# Patient Record
Sex: MALE | Race: ASIAN
[De-identification: names, ages, dates, MRNs, and addresses within clinical notes are randomized per-mention and may not be internally consistent; named-entity substitution may affect disease eponyms.]

---

## 2017-03-07 ENCOUNTER — HOSPITAL (OUTPATIENT)
Dept: OTHER | Age: 82
End: 2017-03-07
Attending: FAMILY MEDICINE

## 2017-12-15 ENCOUNTER — LAB ENCOUNTER (OUTPATIENT)
Dept: LAB | Age: 82
End: 2017-12-15
Attending: PHYSICIAN ASSISTANT
Payer: MEDICARE

## 2017-12-15 DIAGNOSIS — R30.0 DYSURIA: Primary | ICD-10-CM

## 2017-12-15 PROCEDURE — 87086 URINE CULTURE/COLONY COUNT: CPT

## 2021-06-15 ENCOUNTER — HOSPITAL ENCOUNTER (OUTPATIENT)
Dept: GENERAL RADIOLOGY | Age: 86
Discharge: HOME | End: 2021-06-15
Attending: ALLERGY & IMMUNOLOGY
Payer: MEDICAID

## 2021-06-15 DIAGNOSIS — M47.26 OSTEOARTHRITIS OF SPINE WITH RADICULOPATHY, LUMBAR REGION: ICD-10-CM

## 2021-06-15 DIAGNOSIS — J44.9 CHRONIC OBSTRUCTIVE PULMONARY DISEASE, UNSPECIFIED COPD TYPE (HCC): ICD-10-CM

## 2021-06-15 PROCEDURE — 71046 X-RAY EXAM CHEST 2 VIEWS: CPT | Performed by: ALLERGY & IMMUNOLOGY

## 2021-06-15 PROCEDURE — 73560 X-RAY EXAM OF KNEE 1 OR 2: CPT | Performed by: ALLERGY & IMMUNOLOGY

## 2023-08-22 ENCOUNTER — HOSPITAL ENCOUNTER (OUTPATIENT)
Age: 88
Discharge: HOME | End: 2023-08-22
Attending: EMERGENCY MEDICINE
Payer: MEDICARE

## 2023-08-22 ENCOUNTER — APPOINTMENT (OUTPATIENT)
Dept: GENERAL RADIOLOGY | Age: 88
End: 2023-08-22
Attending: EMERGENCY MEDICINE
Payer: MEDICARE

## 2023-08-22 VITALS
OXYGEN SATURATION: 97 % | DIASTOLIC BLOOD PRESSURE: 83 MMHG | HEART RATE: 92 BPM | TEMPERATURE: 98 F | SYSTOLIC BLOOD PRESSURE: 161 MMHG | RESPIRATION RATE: 18 BRPM

## 2023-08-22 DIAGNOSIS — S42.035A CLOSED NONDISPLACED FRACTURE OF ACROMIAL END OF LEFT CLAVICLE, INITIAL ENCOUNTER: Primary | ICD-10-CM

## 2023-08-22 DIAGNOSIS — S22.42XA CLOSED FRACTURE OF MULTIPLE RIBS OF LEFT SIDE, INITIAL ENCOUNTER: ICD-10-CM

## 2023-08-22 PROCEDURE — 73060 X-RAY EXAM OF HUMERUS: CPT | Performed by: EMERGENCY MEDICINE

## 2023-08-22 PROCEDURE — 99204 OFFICE O/P NEW MOD 45 MIN: CPT

## 2023-08-22 PROCEDURE — 73000 X-RAY EXAM OF COLLAR BONE: CPT | Performed by: EMERGENCY MEDICINE

## 2023-08-22 PROCEDURE — 99214 OFFICE O/P EST MOD 30 MIN: CPT

## 2023-08-22 PROCEDURE — 71101 X-RAY EXAM UNILAT RIBS/CHEST: CPT | Performed by: EMERGENCY MEDICINE

## 2023-08-22 PROCEDURE — 23500 CLTX CLAVICULAR FX W/O MNPJ: CPT

## 2023-11-16 ENCOUNTER — HOSPITAL ENCOUNTER (INPATIENT)
Facility: HOSPITAL | Age: 88
LOS: 4 days | Discharge: HOME HEALTH | End: 2023-11-20
Attending: EMERGENCY MEDICINE | Admitting: HOSPITALIST
Payer: MEDICARE

## 2023-11-16 ENCOUNTER — APPOINTMENT (OUTPATIENT)
Dept: GENERAL RADIOLOGY | Facility: HOSPITAL | Age: 88
End: 2023-11-16
Attending: EMERGENCY MEDICINE
Payer: MEDICARE

## 2023-11-16 DIAGNOSIS — J18.9 COMMUNITY ACQUIRED PNEUMONIA OF RIGHT LOWER LOBE OF LUNG: Primary | ICD-10-CM

## 2023-11-16 PROBLEM — J69.0 ASPIRATION PNEUMONITIS (HCC): Status: ACTIVE | Noted: 2023-11-16

## 2023-11-16 LAB
ADENOVIRUS PCR:: NOT DETECTED
ALBUMIN SERPL-MCNC: 4.2 G/DL (ref 3.2–4.8)
ALBUMIN/GLOB SERPL: 1.2 {RATIO} (ref 1–2)
ALP LIVER SERPL-CCNC: 96 U/L
ALT SERPL-CCNC: 13 U/L
ANION GAP SERPL CALC-SCNC: 5 MMOL/L (ref 0–18)
AST SERPL-CCNC: 20 U/L (ref ?–34)
ATRIAL RATE: 104 BPM
B PARAPERT DNA SPEC QL NAA+PROBE: NOT DETECTED
B PERT DNA SPEC QL NAA+PROBE: NOT DETECTED
BASOPHILS # BLD AUTO: 0.02 X10(3) UL (ref 0–0.2)
BASOPHILS NFR BLD AUTO: 0.1 %
BILIRUB SERPL-MCNC: 0.4 MG/DL (ref 0.2–0.9)
BILIRUB UR QL: NEGATIVE
BNP SERPL-MCNC: 210 PG/ML
BUN BLD-MCNC: 25 MG/DL (ref 9–23)
BUN/CREAT SERPL: 24 (ref 10–20)
C PNEUM DNA SPEC QL NAA+PROBE: NOT DETECTED
CALCIUM BLD-MCNC: 9.2 MG/DL (ref 8.7–10.4)
CHLORIDE SERPL-SCNC: 95 MMOL/L (ref 98–112)
CLARITY UR: CLEAR
CO2 SERPL-SCNC: 30 MMOL/L (ref 21–32)
COLOR UR: YELLOW
CORONAVIRUS 229E PCR:: NOT DETECTED
CORONAVIRUS HKU1 PCR:: NOT DETECTED
CORONAVIRUS NL63 PCR:: NOT DETECTED
CORONAVIRUS OC43 PCR:: NOT DETECTED
CREAT BLD-MCNC: 1.04 MG/DL
DEPRECATED RDW RBC AUTO: 42.5 FL (ref 35.1–46.3)
EGFRCR SERPLBLD CKD-EPI 2021: 64 ML/MIN/1.73M2 (ref 60–?)
EOSINOPHIL # BLD AUTO: 0.03 X10(3) UL (ref 0–0.7)
EOSINOPHIL NFR BLD AUTO: 0.2 %
ERYTHROCYTE [DISTWIDTH] IN BLOOD BY AUTOMATED COUNT: 13 % (ref 11–15)
FLUAV RNA SPEC QL NAA+PROBE: NOT DETECTED
FLUBV RNA SPEC QL NAA+PROBE: NOT DETECTED
GLOBULIN PLAS-MCNC: 3.5 G/DL (ref 2.8–4.4)
GLUCOSE BLD-MCNC: 140 MG/DL (ref 70–99)
GLUCOSE UR-MCNC: NORMAL MG/DL
HCT VFR BLD AUTO: 37.4 %
HGB BLD-MCNC: 11.9 G/DL
HGB UR QL STRIP.AUTO: NEGATIVE
IMM GRANULOCYTES # BLD AUTO: 0.05 X10(3) UL (ref 0–1)
IMM GRANULOCYTES NFR BLD: 0.4 %
KETONES UR-MCNC: NEGATIVE MG/DL
L PNEUMO AG UR QL: NEGATIVE
LEUKOCYTE ESTERASE UR QL STRIP.AUTO: NEGATIVE
LYMPHOCYTES # BLD AUTO: 1.53 X10(3) UL (ref 1–4)
LYMPHOCYTES NFR BLD AUTO: 11 %
MCH RBC QN AUTO: 28.5 PG (ref 26–34)
MCHC RBC AUTO-ENTMCNC: 31.8 G/DL (ref 31–37)
MCV RBC AUTO: 89.5 FL
METAPNEUMOVIRUS PCR:: NOT DETECTED
MONOCYTES # BLD AUTO: 1.1 X10(3) UL (ref 0.1–1)
MONOCYTES NFR BLD AUTO: 7.9 %
MYCOPLASMA PNEUMONIA PCR:: NOT DETECTED
NEUTROPHILS # BLD AUTO: 11.18 X10 (3) UL (ref 1.5–7.7)
NEUTROPHILS # BLD AUTO: 11.18 X10(3) UL (ref 1.5–7.7)
NEUTROPHILS NFR BLD AUTO: 80.4 %
NITRITE UR QL STRIP.AUTO: NEGATIVE
OSMOLALITY SERPL CALC.SUM OF ELEC: 277 MOSM/KG (ref 275–295)
P AXIS: 72 DEGREES
P-R INTERVAL: 226 MS
PARAINFLUENZA 1 PCR:: NOT DETECTED
PARAINFLUENZA 2 PCR:: NOT DETECTED
PARAINFLUENZA 3 PCR:: NOT DETECTED
PARAINFLUENZA 4 PCR:: NOT DETECTED
PH UR: 6 [PH] (ref 5–8)
PLATELET # BLD AUTO: 251 10(3)UL (ref 150–450)
POTASSIUM SERPL-SCNC: 4.8 MMOL/L (ref 3.5–5.1)
PROT SERPL-MCNC: 7.7 G/DL (ref 5.7–8.2)
Q-T INTERVAL: 330 MS
QRS DURATION: 112 MS
QTC CALCULATION (BEZET): 433 MS
R AXIS: 105 DEGREES
RBC # BLD AUTO: 4.18 X10(6)UL
RHINOVIRUS/ENTERO PCR:: NOT DETECTED
RSV RNA SPEC QL NAA+PROBE: NOT DETECTED
SARS-COV-2 RNA NPH QL NAA+NON-PROBE: NOT DETECTED
SODIUM SERPL-SCNC: 130 MMOL/L (ref 136–145)
SP GR UR STRIP: 1.02 (ref 1–1.03)
STREP PNEUMO ANTIGEN, URINE: NEGATIVE
T AXIS: 71 DEGREES
TROPONIN I SERPL HS-MCNC: 14 NG/L
UROBILINOGEN UR STRIP-ACNC: NORMAL
VENTRICULAR RATE: 104 BPM
WBC # BLD AUTO: 13.9 X10(3) UL (ref 4–11)

## 2023-11-16 PROCEDURE — 83880 ASSAY OF NATRIURETIC PEPTIDE: CPT | Performed by: EMERGENCY MEDICINE

## 2023-11-16 PROCEDURE — 93010 ELECTROCARDIOGRAM REPORT: CPT

## 2023-11-16 PROCEDURE — 96375 TX/PRO/DX INJ NEW DRUG ADDON: CPT

## 2023-11-16 PROCEDURE — 99285 EMERGENCY DEPT VISIT HI MDM: CPT

## 2023-11-16 PROCEDURE — 93005 ELECTROCARDIOGRAM TRACING: CPT

## 2023-11-16 PROCEDURE — 85025 COMPLETE CBC W/AUTO DIFF WBC: CPT | Performed by: EMERGENCY MEDICINE

## 2023-11-16 PROCEDURE — 96365 THER/PROPH/DIAG IV INF INIT: CPT

## 2023-11-16 PROCEDURE — 94640 AIRWAY INHALATION TREATMENT: CPT

## 2023-11-16 PROCEDURE — 84484 ASSAY OF TROPONIN QUANT: CPT | Performed by: EMERGENCY MEDICINE

## 2023-11-16 PROCEDURE — 94644 CONT INHLJ TX 1ST HOUR: CPT

## 2023-11-16 PROCEDURE — 87449 NOS EACH ORGANISM AG IA: CPT | Performed by: HOSPITALIST

## 2023-11-16 PROCEDURE — 87040 BLOOD CULTURE FOR BACTERIA: CPT | Performed by: HOSPITALIST

## 2023-11-16 PROCEDURE — 0202U NFCT DS 22 TRGT SARS-COV-2: CPT | Performed by: HOSPITALIST

## 2023-11-16 PROCEDURE — 71045 X-RAY EXAM CHEST 1 VIEW: CPT | Performed by: EMERGENCY MEDICINE

## 2023-11-16 PROCEDURE — 80053 COMPREHEN METABOLIC PANEL: CPT | Performed by: EMERGENCY MEDICINE

## 2023-11-16 PROCEDURE — 81003 URINALYSIS AUTO W/O SCOPE: CPT | Performed by: HOSPITALIST

## 2023-11-16 PROCEDURE — 96361 HYDRATE IV INFUSION ADD-ON: CPT

## 2023-11-16 PROCEDURE — 96367 TX/PROPH/DG ADDL SEQ IV INF: CPT

## 2023-11-16 RX ORDER — POLYETHYLENE GLYCOL 3350 17 G/17G
17 POWDER, FOR SOLUTION ORAL DAILY PRN
Status: DISCONTINUED | OUTPATIENT
Start: 2023-11-16 | End: 2023-11-20

## 2023-11-16 RX ORDER — TAMSULOSIN HYDROCHLORIDE 0.4 MG/1
0.4 CAPSULE ORAL DAILY
Status: DISCONTINUED | OUTPATIENT
Start: 2023-11-16 | End: 2023-11-20

## 2023-11-16 RX ORDER — HEPARIN SODIUM 5000 [USP'U]/ML
5000 INJECTION, SOLUTION INTRAVENOUS; SUBCUTANEOUS EVERY 8 HOURS SCHEDULED
Status: DISCONTINUED | OUTPATIENT
Start: 2023-11-16 | End: 2023-11-20

## 2023-11-16 RX ORDER — ACETAMINOPHEN 500 MG
500 TABLET ORAL EVERY 4 HOURS PRN
Status: DISCONTINUED | OUTPATIENT
Start: 2023-11-16 | End: 2023-11-20

## 2023-11-16 RX ORDER — IPRATROPIUM BROMIDE AND ALBUTEROL SULFATE 2.5; .5 MG/3ML; MG/3ML
3 SOLUTION RESPIRATORY (INHALATION)
Status: DISCONTINUED | OUTPATIENT
Start: 2023-11-16 | End: 2023-11-20

## 2023-11-16 RX ORDER — MONTELUKAST SODIUM 10 MG/1
10 TABLET ORAL ONCE AS NEEDED
Status: ACTIVE | OUTPATIENT
Start: 2023-11-16 | End: 2023-11-16

## 2023-11-16 RX ORDER — ONDANSETRON 2 MG/ML
4 INJECTION INTRAMUSCULAR; INTRAVENOUS EVERY 6 HOURS PRN
Status: DISCONTINUED | OUTPATIENT
Start: 2023-11-16 | End: 2023-11-20

## 2023-11-16 RX ORDER — TAMSULOSIN HYDROCHLORIDE 0.4 MG/1
0.4 CAPSULE ORAL DAILY
COMMUNITY

## 2023-11-16 RX ORDER — METHYLPREDNISOLONE SODIUM SUCCINATE 40 MG/ML
40 INJECTION, POWDER, LYOPHILIZED, FOR SOLUTION INTRAMUSCULAR; INTRAVENOUS ONCE
Status: COMPLETED | OUTPATIENT
Start: 2023-11-16 | End: 2023-11-16

## 2023-11-16 RX ORDER — BISACODYL 10 MG
10 SUPPOSITORY, RECTAL RECTAL
Status: DISCONTINUED | OUTPATIENT
Start: 2023-11-16 | End: 2023-11-20

## 2023-11-16 RX ORDER — LEVOTHYROXINE SODIUM 0.07 MG/1
1 TABLET ORAL DAILY
COMMUNITY

## 2023-11-16 RX ORDER — ALBUTEROL SULFATE 90 UG/1
1 AEROSOL, METERED RESPIRATORY (INHALATION) EVERY 6 HOURS
COMMUNITY
Start: 2023-11-02

## 2023-11-16 RX ORDER — GUAIFENESIN 600 MG/1
600 TABLET, EXTENDED RELEASE ORAL 2 TIMES DAILY
Status: DISCONTINUED | OUTPATIENT
Start: 2023-11-16 | End: 2023-11-17

## 2023-11-16 RX ORDER — FLUTICASONE FUROATE AND VILANTEROL 200; 25 UG/1; UG/1
1 POWDER RESPIRATORY (INHALATION) DAILY
Status: DISCONTINUED | OUTPATIENT
Start: 2023-11-16 | End: 2023-11-20

## 2023-11-16 RX ORDER — METOCLOPRAMIDE HYDROCHLORIDE 5 MG/ML
5 INJECTION INTRAMUSCULAR; INTRAVENOUS EVERY 8 HOURS PRN
Status: DISCONTINUED | OUTPATIENT
Start: 2023-11-16 | End: 2023-11-20

## 2023-11-16 RX ORDER — MONTELUKAST SODIUM 10 MG/1
10 TABLET ORAL ONCE AS NEEDED
COMMUNITY
Start: 2023-08-23

## 2023-11-16 RX ORDER — LEVOTHYROXINE SODIUM 0.07 MG/1
75 TABLET ORAL DAILY
Status: DISCONTINUED | OUTPATIENT
Start: 2023-11-16 | End: 2023-11-20

## 2023-11-16 RX ORDER — AZITHROMYCIN 250 MG/1
500 TABLET, FILM COATED ORAL
Status: DISCONTINUED | OUTPATIENT
Start: 2023-11-16 | End: 2023-11-16

## 2023-11-16 RX ORDER — SODIUM CHLORIDE 9 MG/ML
INJECTION, SOLUTION INTRAVENOUS CONTINUOUS
Status: DISCONTINUED | OUTPATIENT
Start: 2023-11-16 | End: 2023-11-17

## 2023-11-16 RX ORDER — BUDESONIDE AND FORMOTEROL FUMARATE DIHYDRATE 160; 4.5 UG/1; UG/1
2 AEROSOL RESPIRATORY (INHALATION) 2 TIMES DAILY
COMMUNITY
Start: 2023-11-08

## 2023-11-16 RX ORDER — BENZONATATE 100 MG/1
100 CAPSULE ORAL 3 TIMES DAILY PRN
Status: DISCONTINUED | OUTPATIENT
Start: 2023-11-16 | End: 2023-11-20

## 2023-11-16 RX ORDER — AZITHROMYCIN 250 MG/1
500 TABLET, FILM COATED ORAL
Status: COMPLETED | OUTPATIENT
Start: 2023-11-17 | End: 2023-11-18

## 2023-11-16 NOTE — ED QUICK NOTES
Rounding Completed    Plan of Care reviewed. Waiting for lab/diagnostic results. Elimination needs assessed. Provided medications for asthma. Pt calm, respirations unlabored. Ongoing neb tx. Bed is locked and in lowest position. Call light within reach.

## 2023-11-16 NOTE — ED QUICK NOTES
Rounding completed    No complaints at this time  Awaiting bed assignment   Elimination assistance offered  No additional needs at this time  Call light within reach, will update patient with more information  Will continue to monitor

## 2023-11-16 NOTE — ED QUICK NOTES
Orders for admission, patient is aware of plan and ready to go upstairs. Any questions, please call ED KARMA Galloway at extension [phone].      Patient Covid vaccination status: Fully vaccinated     COVID Test Ordered in ED: None    COVID Suspicion at Admission: N/A    Running Infusions:    albuterol          Mental Status/LOC at time of transport: AOx2-3    Other pertinent information:   CIWA score: N/A   NIH score:  N/A

## 2023-11-16 NOTE — PLAN OF CARE
Problem: Patient Centered Care  Goal: Patient preferences are identified and integrated in the patient's plan of care  Description: Interventions:  - What would you like us to know as we care for you? Home with family  - Provide timely, complete, and accurate information to patient/family  - Incorporate patient and family knowledge, values, beliefs, and cultural backgrounds into the planning and delivery of care  - Encourage patient/family to participate in care and decision-making at the level they choose  - Honor patient and family perspectives and choices  Outcome: Progressing     Problem: Patient/Family Goals  Goal: Patient/Family Long Term Goal  Description: Patient's Long Term Goal: to get stronger    Interventions:  - PT/OT  - See additional Care Plan goals for specific interventions  Outcome: Progressing  Goal: Patient/Family Short Term Goal  Description: Patient's Short Term Goal: to breathe better    Interventions:   - Monitor vital signs  - See additional Care Plan goals for specific interventions  Outcome: Progressing     Pt alert and oriented X 4. Pt on room air. No complaints throughout the day. Pt on IV fluids. Safety precautions in place, call light within reach.

## 2023-11-17 ENCOUNTER — APPOINTMENT (OUTPATIENT)
Dept: PICC SERVICES | Facility: HOSPITAL | Age: 88
End: 2023-11-17
Attending: HOSPITALIST
Payer: MEDICARE

## 2023-11-17 LAB
ANION GAP SERPL CALC-SCNC: 7 MMOL/L (ref 0–18)
BASOPHILS # BLD AUTO: 0.03 X10(3) UL (ref 0–0.2)
BASOPHILS NFR BLD AUTO: 0.3 %
BUN BLD-MCNC: 29 MG/DL (ref 9–23)
BUN/CREAT SERPL: 30.2 (ref 10–20)
CALCIUM BLD-MCNC: 8.6 MG/DL (ref 8.7–10.4)
CHLORIDE SERPL-SCNC: 100 MMOL/L (ref 98–112)
CO2 SERPL-SCNC: 27 MMOL/L (ref 21–32)
CREAT BLD-MCNC: 0.96 MG/DL
DEPRECATED RDW RBC AUTO: 43.8 FL (ref 35.1–46.3)
EGFRCR SERPLBLD CKD-EPI 2021: 71 ML/MIN/1.73M2 (ref 60–?)
EOSINOPHIL # BLD AUTO: 0.02 X10(3) UL (ref 0–0.7)
EOSINOPHIL NFR BLD AUTO: 0.2 %
ERYTHROCYTE [DISTWIDTH] IN BLOOD BY AUTOMATED COUNT: 13.2 % (ref 11–15)
GLUCOSE BLD-MCNC: 96 MG/DL (ref 70–99)
HCT VFR BLD AUTO: 32.6 %
HGB BLD-MCNC: 10.1 G/DL
IMM GRANULOCYTES # BLD AUTO: 0.05 X10(3) UL (ref 0–1)
IMM GRANULOCYTES NFR BLD: 0.4 %
LYMPHOCYTES # BLD AUTO: 1.97 X10(3) UL (ref 1–4)
LYMPHOCYTES NFR BLD AUTO: 17.5 %
MAGNESIUM SERPL-MCNC: 1.9 MG/DL (ref 1.6–2.6)
MCH RBC QN AUTO: 28.1 PG (ref 26–34)
MCHC RBC AUTO-ENTMCNC: 31 G/DL (ref 31–37)
MCV RBC AUTO: 90.8 FL
MONOCYTES # BLD AUTO: 1.16 X10(3) UL (ref 0.1–1)
MONOCYTES NFR BLD AUTO: 10.3 %
NEUTROPHILS # BLD AUTO: 8.03 X10 (3) UL (ref 1.5–7.7)
NEUTROPHILS # BLD AUTO: 8.03 X10(3) UL (ref 1.5–7.7)
NEUTROPHILS NFR BLD AUTO: 71.3 %
OSMOLALITY SERPL CALC.SUM OF ELEC: 284 MOSM/KG (ref 275–295)
PLATELET # BLD AUTO: 237 10(3)UL (ref 150–450)
POTASSIUM SERPL-SCNC: 4.6 MMOL/L (ref 3.5–5.1)
RBC # BLD AUTO: 3.59 X10(6)UL
SODIUM SERPL-SCNC: 134 MMOL/L (ref 136–145)
WBC # BLD AUTO: 11.3 X10(3) UL (ref 4–11)

## 2023-11-17 PROCEDURE — 85025 COMPLETE CBC W/AUTO DIFF WBC: CPT | Performed by: HOSPITALIST

## 2023-11-17 PROCEDURE — 94640 AIRWAY INHALATION TREATMENT: CPT

## 2023-11-17 PROCEDURE — 83735 ASSAY OF MAGNESIUM: CPT | Performed by: HOSPITALIST

## 2023-11-17 PROCEDURE — 97116 GAIT TRAINING THERAPY: CPT

## 2023-11-17 PROCEDURE — 80048 BASIC METABOLIC PNL TOTAL CA: CPT | Performed by: HOSPITALIST

## 2023-11-17 PROCEDURE — 92610 EVALUATE SWALLOWING FUNCTION: CPT

## 2023-11-17 RX ORDER — IPRATROPIUM BROMIDE AND ALBUTEROL SULFATE 2.5; .5 MG/3ML; MG/3ML
3 SOLUTION RESPIRATORY (INHALATION) EVERY 6 HOURS PRN
Status: DISCONTINUED | OUTPATIENT
Start: 2023-11-17 | End: 2023-11-20

## 2023-11-17 RX ORDER — SODIUM CHLORIDE 9 MG/ML
INJECTION, SOLUTION INTRAVENOUS CONTINUOUS
Status: ACTIVE | OUTPATIENT
Start: 2023-11-17 | End: 2023-11-17

## 2023-11-17 RX ORDER — METHYLPREDNISOLONE SODIUM SUCCINATE 40 MG/ML
40 INJECTION, POWDER, LYOPHILIZED, FOR SOLUTION INTRAMUSCULAR; INTRAVENOUS DAILY
Status: DISCONTINUED | OUTPATIENT
Start: 2023-11-17 | End: 2023-11-18

## 2023-11-17 RX ORDER — SIMETHICONE 80 MG
80 TABLET,CHEWABLE ORAL 4 TIMES DAILY PRN
Status: DISCONTINUED | OUTPATIENT
Start: 2023-11-17 | End: 2023-11-20

## 2023-11-17 NOTE — CM/SW NOTE
Social work received an update that PT is recommending [facility]. Social work sent [facility] Referrals in [facility]. Social work will provide list of [facility] options once available. SW/CM to remain available for support and/or discharge planning.      Carol Pop MSW, [facility]  Discharge Planner [id number]

## 2023-11-17 NOTE — PHYSICAL THERAPY NOTE
PHYSICAL THERAPY EVALUATION - INPATIENT     Room Number: 329/329-A  Evaluation Date: 11/17/2023  Type of Evaluation: Initial   Physician Order: PT Eval and Treat    Presenting Problem: Pneumonia  Co-Morbidities : advanced age 79 y/o  Reason for Therapy: Mobility Dysfunction and Discharge Planning    PHYSICAL THERAPY ASSESSMENT     Patient is a 8 year old male admitted 11/16/2023 for Pneumonia RLE. Patient's current functional deficits include decreased bed mobility transfers gait, balance strength, decreased endurance, which are below the patient's pre-admission status. Pt ed with bed mobility and transfers with SBA with RW. Pt ed with gait progression 50' with step to gait and CGA. Pt ed with transfers back to chair with CGA pt denies dizziness with amb. Pt is on track to return home with [facility] PT with son assist as medical progress allows. The patient's Approx Degree of Impairment: 46.58% has been calculated based on documentation in the [facility] '6 clicks' Inpatient Basic Mobility Short Form. Research supports that patients with this level of impairment may benefit from [facility] PT with family assist as medical progress allows. Patient will benefit from continued IP PT services to address these deficits in preparation for discharge. DISCHARGE RECOMMENDATIONS  PT Discharge Recommendations: Home with home health PT;24 hour care/supervision (family assist)    PLAN  PT Treatment Plan: Bed mobility; Body mechanics; Endurance; Energy conservation;Patient education;Gait training;Strengthening;Transfer training;Balance training;Family education  Rehab Potential : Good  Frequency (Obs): 3-5x/week       PHYSICAL THERAPY MEDICAL/SOCIAL HISTORY     History related to current admission: Mr. Fidel Vernon is a 8 year old male with PMH sig for non-Hodgkin's large B cell s/p chemotherapy, hypothyroidism, BPH who presents with cough and congestion.   Patient and son states that yesterday he began coughing, short of breath, noted mucous but was unable to get anything up, his son gave him a few breathing treatments that helped, but over the last few hours he has been more short of breath with exertion, and continually coughing so he came to the ER. He denies any chest pain, no nausea or vomiting, no headaches or vision changes. Problem List  Principal Problem:    Community acquired pneumonia of right lower lobe of lung  Active Problems:    Aspiration pneumonitis ([address]      HOME SITUATION  Home Situation  Type of Home: House  Home Layout: Able to live on main level; One level  Stairs to Enter : 2  Railing: Yes  Lives With: Son;Friend(s)  Drives: No  Patient Owned Equipment: Rolling walker     Prior Level of [place]: lives with supportive son and family. Mod indep with a RW with all mobility and ADL's.    SUBJECTIVE  I feel ok just a little weak with a soar throat. PHYSICAL THERAPY EXAMINATION     OBJECTIVE  Precautions: Bed/chair alarm  Fall Risk: Standard fall risk    WEIGHT BEARING RESTRICTION                PAIN ASSESSMENT  Rating: 3  Location: throat  Management Techniques: Activity promotion; Body mechanics;Breathing techniques;Relaxation;Repositioning    COGNITION  Overall Cognitive Status:  WFL - within functional limits    RANGE OF MOTION AND STRENGTH ASSESSMENT  Upper extremity ROM and strength are within functional limits   Lower extremity ROM is within functional limits   Lower extremity strength is within functional limits 5/5    BALANCE  Static Sitting: Good  Dynamic Sitting: Fair +  Static Standing: Fair -  Dynamic Standing: Fair -    ADDITIONAL TESTS                                    NEUROLOGICAL FINDINGS                      ACTIVITY TOLERANCE  Pulse: 82  Heart Rate Source: Monitor  Resp: 18  BP: 132/75  BP Location: Right arm  BP Method: Automatic  Patient Position: Sitting    O2 WALK       AM-PAC '6-Clicks' INPATIENT SHORT FORM - BASIC MOBILITY  How much difficulty does the patient currently have. ..   Patient Difficulty: Turning over in bed (including adjusting bedclothes, sheets and blankets)?: A Little   Patient Difficulty: Sitting down on and standing up from a chair with arms (e.g., wheelchair, bedside commode, etc.): A Little   Patient Difficulty: Moving from lying on back to sitting on the side of the bed?: A Little   How much help from another person does the patient currently need. .. Help from Another: Moving to and from a bed to a chair (including a wheelchair)?: A Little   Help from Another: Need to walk in hospital room?: A Little   Help from Another: Climbing 3-5 steps with a railing?: A Little     AM-PAC Score:  Raw Score: 18   Approx Degree of Impairment: 46.58%   Standardized Score (AM-PAC Scale): 43.63   CMS Modifier (G-Code): CK    FUNCTIONAL ABILITY STATUS  Functional Mobility/Gait Assessment  Gait Assistance: Contact guard assist  Distance (ft): 50  Assistive Device: Rolling walker  Pattern: Shuffle (step to gait decreased step length)    Bed Mobility: SBA     Transfers: CGA with RW    Exercise/Education Provided:  Bed mobility  Body mechanics  Energy conservation  Functional activity tolerated  Gait training  Posture  Strengthening  Lower therapeutic exercise: Ankle pumps  Heel slides  LAQ  Transfer training    Patient End of Session: Up in chair; With [address] [facility] [address] staff;Needs met;Call light within reach;RN aware of session/findings; All patient questions and concerns addressed; Alarm set    CURRENT GOALS    Goals to be met by: 11/30/2023  Patient Goal Patient's self-stated goal is: Return home    Goal #1 Patient is able to demonstrate supine - sit EOB @ level: independent     Goal #1   Current Status    Goal #2 Patient is able to demonstrate transfers Sit to/from Stand at assistance level: modified independent with walker - rolling     Goal #2  Current Status    Goal #3 Patient is able to ambulate 150 feet with assist device: walker - rolling at assistance level: modified independent   Goal #3   Current Status Goal #4 Patient will negotiate 3 stairs/one curb w/ assistive device and supervision   Goal #4   Current Status    Goal #5 Patient to demonstrate independence with home activity/exercise instructions provided to patient in preparation for discharge.    Goal #5   Current Status    Goal #6    Goal #6  Current Status    Patient Evaluation Complexity Level:  History Low - no personal factors and/or co-morbidities   Examination of body systems Low - addressing 1-2 elements   Clinical Presentation Low - Stable   Clinical Decision Making Low Complexity       Gait Training: 15 minutes

## 2023-11-17 NOTE — PLAN OF CARE
Patient is Aox3-4 on RA. Lungs sounds are congested. Janie nebulizers. 1x w/ walker. Remote tele no calls. General/pureed diet. Primofit in place. Plan pending. Family at the bedside. Fall precautions in place. Problem: Patient Centered Care  Goal: Patient preferences are identified and integrated in the patient's plan of care  Description: Interventions:  - What would you like us to know as we care for you?  Home with family  - Provide timely, complete, and accurate information to patient/family  - Incorporate patient and family knowledge, values, beliefs, and cultural backgrounds into the planning and delivery of care  - Encourage patient/family to participate in care and decision-making at the level they choose  - Honor patient and family perspectives and choices  Outcome: Progressing     Problem: Patient/Family Goals  Goal: Patient/Family Long Term Goal  Description: Patient's Long Term Goal: to get stronger    Interventions:  - PT/OT  - See additional Care Plan goals for specific interventions  Outcome: Progressing  Goal: Patient/Family Short Term Goal  Description: Patient's Short Term Goal: to breathe better    Interventions:   - Monitor vital signs  - See additional Care Plan goals for specific interventions  Outcome: Progressing

## 2023-11-17 NOTE — SLP NOTE
ADULT SWALLOWING EVALUATION    ASSESSMENT    ASSESSMENT/OVERALL IMPRESSION:  PPE REQUIRED. THIS THERAPIST WORE GLOVES AND MASK FOR DURATION OF EVALUATION. HANDS WASHED UPON ENTRANCE/EXIT. Per MD note, \"Mr. Lesly Colvin is a 8 year old male with PMH sig for non-Hodgkin's large B cell s/p chemotherapy, hypothyroidism, BPH who presents with cough and congestion. Patient and son states that yesterday he began coughing, short of breath, noted mucous but was unable to get anything up, his son gave him a few breathing treatments that helped, but over the last few hours he has been more short of breath with exertion, and continually coughing so he came to the ER. \"  SLP BSSE orders received and acknowledged. A swallow evaluation warranted per \"dysphagia\". Pt afebrile with clear vocal quality, on room air, with oxygen saturation at 96%. Pt with no hx of dysphagia at [address] [place] [address]. Pt positioned 90 degrees in bedside chair, alert/cooperative/son present. Pt with no complaints of pain. Oral motor skills within functional limits. Pt edentulous at time of evaluation. Pt presented with trials of puree, mildly thick liquids via tsp and thin liquids via straw/cup. Pt with adequate oral acceptance and bilabial seal across all trials. Pt with reduced bolus formation/propulsion. Pt's swallow response appears delayed with reduced hyolaryngeal elevation/excursion. No clinical signs of aspiration (e.g., immediate/delayed throat clear, immediate/delayed cough, wet vocal quality, increased O2 effort) observed across all trials. 11/16 CXR indicates \"Indeterminate ovoid nodular lesion at the right lung base. This could reflect pneumonia or, potentially, an underlying neoplastic process. Follow-up chest CT is recommended, preferably with contrast. Nonspecific basilar-predominant interstitial opacities. Subacute distal left clavicular fracture as well as minimally displaced fractures of the posterior left upper ribs\".  Oxygen status remained stable t/o the entire evaluation. At this time, pt presents with mild oral dysphagia and probable pharyngeal dysfunction. Recommend a puree diet and thin liquids with strict adherence to safe swallowing compensatory strategies. Results and recommendations reviewed with RN, pt, and family. Pt/pt's family v/u to all results/recommendations. Recommendations remain written on whiteboard. SLP collaborated with RN for MD diet orders. PLAN: SLP to f/u x2 meal assessments, monitor imaging, and VFSS if clinically indicated         RECOMMENDATIONS   Diet Recommendations - Solids: Puree  Diet Recommendations - Liquids: Thin Liquids                        Compensatory Strategies Recommended: Slow rate;Small bites and sips  Aspiration Precautions: Upright position; Slow rate;Small bites and sips  Medication Administration Recommendations: Crushed in puree  Treatment Plan/Recommendations: Aspiration precautions  Discharge Recommendations/Plan: Undetermined    HISTORY   MEDICAL HISTORY  Reason for Referral:  (\"dysphagia\")    Problem List  Principal Problem:    Community acquired pneumonia of right lower lobe of lung  Active Problems:    Aspiration pneumonitis [address] [address]      Past Medical History  Past Medical History:   Diagnosis Date    Asthma     Cancer ([facility]        Prior Living Situation: Home with support  Diet Prior to Admission: Mechanical soft ground/ Minced & Moist;Thin liquids  Precautions: Aspiration    Patient/Family Goals: did not state    SWALLOWING HISTORY  Current Diet Consistency: Puree; Thin liquids  Dysphagia History: VFSS 2017 [place], no aspiration  Imaging Results: see above    SUBJECTIVE       OBJECTIVE   ORAL MOTOR EXAMINATION  Dentition: Lower dentures; Upper dentures  Symmetry: Within Functional Limits  Strength:  Within Functional Limits  Tone: Within Functional Limits  Range of Motion: Within Functional Limits  Rate of Motion: Within Functional Limits    Voice Quality: Clear  Respiratory Status: Unlabored  Consistencies Trialed: Thin liquids; Nectar thick liquids;Puree  Method of Presentation: Staff/Clinician assistance;Self presentation;Cup;Straw;Spoon  Patient Positioning: Upright;Midline    Oral Phase of Swallow: Impaired  Bolus Retrieval: Intact  Bilabial Seal: Intact  Bolus Formation: Impaired  Bolus Propulsion: Impaired     Retention: Intact    Pharyngeal Phase of Swallow: Impaired  Laryngeal Elevation Timing: Appears impaired  Laryngeal Elevation Strength: Appears impaired  Laryngeal Elevation Coordination: Appears impaired  (Please note: Silent aspiration cannot be evaluated clinically. Videofluoroscopic Swallow Study is required to rule-out silent aspiration.)       Comments: NA              GOALS  Goal #1 The patient will tolerate puree consistency and thin liquids without overt signs or symptoms of aspiration with 100 % accuracy over 1-2 session(s). In Progress   Goal #2 The patient/family/caregiver will demonstrate understanding and implementation of aspiration precautions and swallow strategies independently over 1-2 session(s). In Progress   Goal #3 The patient will utilize compensatory strategies as outlined by  BSSE (clinical evaluation) including Slow rate, Small bites, Small sips, Upright 90 degrees, Upright 90 degrees 30 mins after meal, Supervision with meals with PRN assistance 100 % of the time across 2 sessions. In Progress   Goal #4 The patient will tolerate trial upgrade of minced & moist consistency and thin liquids without overt signs or symptoms of aspiration with 100 % accuracy over 1-2 session(s). In Progress     FOLLOW UP  Treatment Plan/Recommendations: Aspiration precautions  Number of Visits to Meet Established Goals: 2  Follow Up Needed (Documentation Required): Yes  SLP Follow-up Date: 11/18/23    Thank you for your referral.   If you have any questions, please contact MYLES Noriega  Phone Number Ext. [phone]

## 2023-11-17 NOTE — PLAN OF CARE
Up in carpenter with walker with assist. Swallow eval completed, puree diet continues, crush meds. Right chest port accessed. IV ABX and steroid. Primo fit. LBM 11/15/23. DC home is plan. Problem: Patient Centered Care  Goal: Patient preferences are identified and integrated in the patient's plan of care  Description: Interventions:  - What would you like us to know as we care for you?  Home with family  - Provide timely, complete, and accurate information to patient/family  - Incorporate patient and family knowledge, values, beliefs, and cultural backgrounds into the planning and delivery of care  - Encourage patient/family to participate in care and decision-making at the level they choose  - Honor patient and family perspectives and choices  Outcome: Progressing     Problem: Patient/Family Goals  Goal: Patient/Family Long Term Goal  Description: Patient's Long Term Goal: to get stronger    Interventions:  - PT/OT  - See additional Care Plan goals for specific interventions  Outcome: Progressing  Goal: Patient/Family Short Term Goal  Description: Patient's Short Term Goal: to breathe better    Interventions:   - Monitor vital signs  - See additional Care Plan goals for specific interventions  Outcome: Progressing     Problem: RESPIRATORY - ADULT  Goal: Achieves optimal ventilation and oxygenation  Description: INTERVENTIONS:  - Assess for changes in respiratory status  - Assess for changes in mentation and behavior  - Position to facilitate oxygenation and minimize respiratory effort  - Oxygen supplementation based on oxygen saturation or ABGs  - Provide Smoking Cessation handout, if applicable  - Encourage broncho-pulmonary hygiene including cough, deep breathe, Incentive Spirometry  - Assess the need for suctioning and perform as needed  - Assess and instruct to report SOB or any respiratory difficulty  - Respiratory Therapy support as indicated  - Manage/alleviate anxiety  - Monitor for signs/symptoms of CO2 retention  Outcome: Progressing     Problem: CARDIOVASCULAR - ADULT  Goal: Maintains optimal cardiac output and hemodynamic stability  Description: INTERVENTIONS:  - Monitor vital signs, rhythm, and trends  - Monitor for bleeding, hypotension and signs of decreased cardiac output  - Evaluate effectiveness of vasoactive medications to optimize hemodynamic stability  - Monitor arterial and/or venous puncture sites for bleeding and/or hematoma  - Assess quality of pulses, skin color and temperature  - Assess for signs of decreased coronary artery perfusion - ex.  Angina  - Evaluate fluid balance, assess for edema, trend weights  Outcome: Progressing     Problem: METABOLIC/FLUID AND ELECTROLYTES - ADULT  Goal: Electrolytes maintained within normal limits  Description: INTERVENTIONS:  - Monitor labs and rhythm and assess patient for signs and symptoms of electrolyte imbalances  - Administer electrolyte replacement as ordered  - Monitor response to electrolyte replacements, including rhythm and repeat lab results as appropriate  - Fluid restriction as ordered  - Instruct patient on fluid and nutrition restrictions as appropriate  Outcome: Progressing     Problem: SKIN/TISSUE INTEGRITY - ADULT  Goal: Skin integrity remains intact  Description: INTERVENTIONS  - Assess and document risk factors for pressure ulcer development  - Assess and document skin integrity  - Monitor for areas of redness and/or skin breakdown  - Initiate interventions, skin care algorithm/standards of care as needed  Outcome: Progressing

## 2023-11-17 NOTE — PLAN OF CARE
No acute changes over night. Pt is alert and oriented times 3 on RA. Remote tele in place. Nebulizer treatment given. Primo fit in place. Pt tolerating general pureed diet. IVF running as ordered. Pt ambulating with assist and a walker. Call light is within reach and safety measures are in place. Problem: RESPIRATORY - ADULT  Goal: Achieves optimal ventilation and oxygenation  Description: INTERVENTIONS:  - Assess for changes in respiratory status  - Assess for changes in mentation and behavior  - Position to facilitate oxygenation and minimize respiratory effort  - Oxygen supplementation based on oxygen saturation or ABGs  - Provide Smoking Cessation handout, if applicable  - Encourage broncho-pulmonary hygiene including cough, deep breathe, Incentive Spirometry  - Assess the need for suctioning and perform as needed  - Assess and instruct to report SOB or any respiratory difficulty  - Respiratory Therapy support as indicated  - Manage/alleviate anxiety  - Monitor for signs/symptoms of CO2 retention  Outcome: Progressing     Problem: CARDIOVASCULAR - ADULT  Goal: Maintains optimal cardiac output and hemodynamic stability  Description: INTERVENTIONS:  - Monitor vital signs, rhythm, and trends  - Monitor for bleeding, hypotension and signs of decreased cardiac output  - Evaluate effectiveness of vasoactive medications to optimize hemodynamic stability  - Monitor arterial and/or venous puncture sites for bleeding and/or hematoma  - Assess quality of pulses, skin color and temperature  - Assess for signs of decreased coronary artery perfusion - ex.  Angina  - Evaluate fluid balance, assess for edema, trend weights  Outcome: Progressing     Problem: METABOLIC/FLUID AND ELECTROLYTES - ADULT  Goal: Electrolytes maintained within normal limits  Description: INTERVENTIONS:  - Monitor labs and rhythm and assess patient for signs and symptoms of electrolyte imbalances  - Administer electrolyte replacement as ordered  - Monitor response to electrolyte replacements, including rhythm and repeat lab results as appropriate  - Fluid restriction as ordered  - Instruct patient on fluid and nutrition restrictions as appropriate  Outcome: Progressing     Problem: SKIN/TISSUE INTEGRITY - ADULT  Goal: Skin integrity remains intact  Description: INTERVENTIONS  - Assess and document risk factors for pressure ulcer development  - Assess and document skin integrity  - Monitor for areas of redness and/or skin breakdown  - Initiate interventions, skin care algorithm/standards of care as needed  Outcome: Progressing

## 2023-11-18 ENCOUNTER — APPOINTMENT (OUTPATIENT)
Dept: CT IMAGING | Facility: HOSPITAL | Age: 88
End: 2023-11-18
Attending: HOSPITALIST
Payer: MEDICARE

## 2023-11-18 LAB
ANION GAP SERPL CALC-SCNC: 3 MMOL/L (ref 0–18)
BASOPHILS # BLD AUTO: 0.01 X10(3) UL (ref 0–0.2)
BASOPHILS NFR BLD AUTO: 0.1 %
BUN BLD-MCNC: 26 MG/DL (ref 9–23)
BUN/CREAT SERPL: 28.3 (ref 10–20)
CALCIUM BLD-MCNC: 8.5 MG/DL (ref 8.7–10.4)
CHLORIDE SERPL-SCNC: 101 MMOL/L (ref 98–112)
CO2 SERPL-SCNC: 27 MMOL/L (ref 21–32)
CREAT BLD-MCNC: 0.92 MG/DL
DEPRECATED RDW RBC AUTO: 42.5 FL (ref 35.1–46.3)
EGFRCR SERPLBLD CKD-EPI 2021: 74 ML/MIN/1.73M2 (ref 60–?)
EOSINOPHIL # BLD AUTO: 0 X10(3) UL (ref 0–0.7)
EOSINOPHIL NFR BLD AUTO: 0 %
ERYTHROCYTE [DISTWIDTH] IN BLOOD BY AUTOMATED COUNT: 13.1 % (ref 11–15)
GLUCOSE BLD-MCNC: 127 MG/DL (ref 70–99)
HCT VFR BLD AUTO: 31.3 %
HGB BLD-MCNC: 10 G/DL
IMM GRANULOCYTES # BLD AUTO: 0.06 X10(3) UL (ref 0–1)
IMM GRANULOCYTES NFR BLD: 0.5 %
LYMPHOCYTES # BLD AUTO: 1.15 X10(3) UL (ref 1–4)
LYMPHOCYTES NFR BLD AUTO: 10.2 %
MAGNESIUM SERPL-MCNC: 1.8 MG/DL (ref 1.6–2.6)
MCH RBC QN AUTO: 28.6 PG (ref 26–34)
MCHC RBC AUTO-ENTMCNC: 31.9 G/DL (ref 31–37)
MCV RBC AUTO: 89.4 FL
MONOCYTES # BLD AUTO: 0.89 X10(3) UL (ref 0.1–1)
MONOCYTES NFR BLD AUTO: 7.9 %
NEUTROPHILS # BLD AUTO: 9.18 X10 (3) UL (ref 1.5–7.7)
NEUTROPHILS # BLD AUTO: 9.18 X10(3) UL (ref 1.5–7.7)
NEUTROPHILS NFR BLD AUTO: 81.3 %
OSMOLALITY SERPL CALC.SUM OF ELEC: 278 MOSM/KG (ref 275–295)
PLATELET # BLD AUTO: 229 10(3)UL (ref 150–450)
POTASSIUM SERPL-SCNC: 4.4 MMOL/L (ref 3.5–5.1)
RBC # BLD AUTO: 3.5 X10(6)UL
SODIUM SERPL-SCNC: 131 MMOL/L (ref 136–145)
WBC # BLD AUTO: 11.3 X10(3) UL (ref 4–11)

## 2023-11-18 PROCEDURE — 94799 UNLISTED PULMONARY SVC/PX: CPT

## 2023-11-18 PROCEDURE — 70450 CT HEAD/BRAIN W/O DYE: CPT | Performed by: HOSPITALIST

## 2023-11-18 PROCEDURE — 85025 COMPLETE CBC W/AUTO DIFF WBC: CPT | Performed by: HOSPITALIST

## 2023-11-18 PROCEDURE — 94640 AIRWAY INHALATION TREATMENT: CPT

## 2023-11-18 PROCEDURE — 93010 ELECTROCARDIOGRAM REPORT: CPT | Performed by: INTERNAL MEDICINE

## 2023-11-18 PROCEDURE — 80048 BASIC METABOLIC PNL TOTAL CA: CPT | Performed by: HOSPITALIST

## 2023-11-18 PROCEDURE — 83735 ASSAY OF MAGNESIUM: CPT | Performed by: HOSPITALIST

## 2023-11-18 PROCEDURE — 93005 ELECTROCARDIOGRAM TRACING: CPT

## 2023-11-18 RX ORDER — MAGNESIUM OXIDE 400 MG/1
400 TABLET ORAL ONCE
Status: COMPLETED | OUTPATIENT
Start: 2023-11-18 | End: 2023-11-18

## 2023-11-18 RX ORDER — HYDRALAZINE HYDROCHLORIDE 25 MG/1
25 TABLET, FILM COATED ORAL EVERY 8 HOURS PRN
Status: DISCONTINUED | OUTPATIENT
Start: 2023-11-18 | End: 2023-11-20

## 2023-11-18 RX ORDER — PREDNISONE 20 MG/1
40 TABLET ORAL
Status: DISCONTINUED | OUTPATIENT
Start: 2023-11-19 | End: 2023-11-20

## 2023-11-18 NOTE — PLAN OF CARE
Problem: Patient Centered Care  Goal: Patient preferences are identified and integrated in the patient's plan of care  Description: Interventions:  - What would you like us to know as we care for you?  Home with family  - Provide timely, complete, and accurate information to patient/family  - Incorporate patient and family knowledge, values, beliefs, and cultural backgrounds into the planning and delivery of care  - Encourage patient/family to participate in care and decision-making at the level they choose  - Honor patient and family perspectives and choices  Outcome: Progressing     Problem: Patient/Family Goals  Goal: Patient/Family Long Term Goal  Description: Patient's Long Term Goal: to get stronger    Interventions:  - PT/OT  - See additional Care Plan goals for specific interventions  Outcome: Progressing  Goal: Patient/Family Short Term Goal  Description: Patient's Short Term Goal: to breathe better    Interventions:   - Monitor vital signs  - See additional Care Plan goals for specific interventions  Outcome: Progressing     Problem: RESPIRATORY - ADULT  Goal: Achieves optimal ventilation and oxygenation  Description: INTERVENTIONS:  - Assess for changes in respiratory status  - Assess for changes in mentation and behavior  - Position to facilitate oxygenation and minimize respiratory effort  - Oxygen supplementation based on oxygen saturation or ABGs  - Provide Smoking Cessation handout, if applicable  - Encourage broncho-pulmonary hygiene including cough, deep breathe, Incentive Spirometry  - Assess the need for suctioning and perform as needed  - Assess and instruct to report SOB or any respiratory difficulty  - Respiratory Therapy support as indicated  - Manage/alleviate anxiety  - Monitor for signs/symptoms of CO2 retention  Outcome: Progressing     Problem: CARDIOVASCULAR - ADULT  Goal: Maintains optimal cardiac output and hemodynamic stability  Description: INTERVENTIONS:  - Monitor vital signs, rhythm, and trends  - Monitor for bleeding, hypotension and signs of decreased cardiac output  - Evaluate effectiveness of vasoactive medications to optimize hemodynamic stability  - Monitor arterial and/or venous puncture sites for bleeding and/or hematoma  - Assess quality of pulses, skin color and temperature  - Assess for signs of decreased coronary artery perfusion - ex.  Angina  - Evaluate fluid balance, assess for edema, trend weights  Outcome: Progressing     Problem: METABOLIC/FLUID AND ELECTROLYTES - ADULT  Goal: Electrolytes maintained within normal limits  Description: INTERVENTIONS:  - Monitor labs and rhythm and assess patient for signs and symptoms of electrolyte imbalances  - Administer electrolyte replacement as ordered  - Monitor response to electrolyte replacements, including rhythm and repeat lab results as appropriate  - Fluid restriction as ordered  - Instruct patient on fluid and nutrition restrictions as appropriate  Outcome: Progressing     Problem: SKIN/TISSUE INTEGRITY - ADULT  Goal: Skin integrity remains intact  Description: INTERVENTIONS  - Assess and document risk factors for pressure ulcer development  - Assess and document skin integrity  - Monitor for areas of redness and/or skin breakdown  - Initiate interventions, skin care algorithm/standards of care as needed  Outcome: Progressing

## 2023-11-19 ENCOUNTER — APPOINTMENT (OUTPATIENT)
Dept: GENERAL RADIOLOGY | Facility: HOSPITAL | Age: 88
End: 2023-11-19
Attending: HOSPITALIST
Payer: MEDICARE

## 2023-11-19 ENCOUNTER — APPOINTMENT (OUTPATIENT)
Dept: CV DIAGNOSTICS | Facility: HOSPITAL | Age: 88
End: 2023-11-19
Attending: HOSPITALIST
Payer: MEDICARE

## 2023-11-19 LAB
MAGNESIUM SERPL-MCNC: 1.9 MG/DL (ref 1.6–2.6)
Q-T INTERVAL: 378 MS
QRS DURATION: 120 MS
QTC CALCULATION (BEZET): 482 MS
R AXIS: 110 DEGREES
T AXIS: 23 DEGREES
TSI SER-ACNC: 1.47 MIU/ML (ref 0.55–4.78)
VENTRICULAR RATE: 98 BPM

## 2023-11-19 PROCEDURE — 94799 UNLISTED PULMONARY SVC/PX: CPT

## 2023-11-19 PROCEDURE — 93306 TTE W/DOPPLER COMPLETE: CPT | Performed by: HOSPITALIST

## 2023-11-19 PROCEDURE — 94640 AIRWAY INHALATION TREATMENT: CPT

## 2023-11-19 PROCEDURE — 71046 X-RAY EXAM CHEST 2 VIEWS: CPT | Performed by: HOSPITALIST

## 2023-11-19 PROCEDURE — 83735 ASSAY OF MAGNESIUM: CPT | Performed by: HOSPITALIST

## 2023-11-19 PROCEDURE — 84443 ASSAY THYROID STIM HORMONE: CPT | Performed by: HOSPITALIST

## 2023-11-19 RX ORDER — METRONIDAZOLE 500 MG/100ML
500 INJECTION, SOLUTION INTRAVENOUS EVERY 8 HOURS
Status: DISCONTINUED | OUTPATIENT
Start: 2023-11-19 | End: 2023-11-20

## 2023-11-19 RX ORDER — DILTIAZEM HYDROCHLORIDE 120 MG/1
120 CAPSULE, EXTENDED RELEASE ORAL DAILY
Status: DISCONTINUED | OUTPATIENT
Start: 2023-11-19 | End: 2023-11-20

## 2023-11-19 NOTE — PLAN OF CARE
Patient will ambulate 3xs today per Dr. Moises Aguilar. Patient experiencing a high heart rate while ambulating. Cardio on consult. Chest xray done today due to coughing. Call light at bedside and safety measures in place. Problem: Patient Centered Care  Goal: Patient preferences are identified and integrated in the patient's plan of care  Description: Interventions:  - What would you like us to know as we care for you?  Home with family  - Provide timely, complete, and accurate information to patient/family  - Incorporate patient and family knowledge, values, beliefs, and cultural backgrounds into the planning and delivery of care  - Encourage patient/family to participate in care and decision-making at the level they choose  - Honor patient and family perspectives and choices  Outcome: Progressing     Problem: Patient/Family Goals  Goal: Patient/Family Long Term Goal  Description: Patient's Long Term Goal: to get stronger    Interventions:  - PT/OT  - See additional Care Plan goals for specific interventions  Outcome: Progressing  Goal: Patient/Family Short Term Goal  Description: Patient's Short Term Goal: to breathe better    Interventions:   - Monitor vital signs  - See additional Care Plan goals for specific interventions  Outcome: Progressing     Problem: RESPIRATORY - ADULT  Goal: Achieves optimal ventilation and oxygenation  Description: INTERVENTIONS:  - Assess for changes in respiratory status  - Assess for changes in mentation and behavior  - Position to facilitate oxygenation and minimize respiratory effort  - Oxygen supplementation based on oxygen saturation or ABGs  - Provide Smoking Cessation handout, if applicable  - Encourage broncho-pulmonary hygiene including cough, deep breathe, Incentive Spirometry  - Assess the need for suctioning and perform as needed  - Assess and instruct to report SOB or any respiratory difficulty  - Respiratory Therapy support as indicated  - Manage/alleviate anxiety  - Monitor for signs/symptoms of CO2 retention  Outcome: Progressing     Problem: CARDIOVASCULAR - ADULT  Goal: Maintains optimal cardiac output and hemodynamic stability  Description: INTERVENTIONS:  - Monitor vital signs, rhythm, and trends  - Monitor for bleeding, hypotension and signs of decreased cardiac output  - Evaluate effectiveness of vasoactive medications to optimize hemodynamic stability  - Monitor arterial and/or venous puncture sites for bleeding and/or hematoma  - Assess quality of pulses, skin color and temperature  - Assess for signs of decreased coronary artery perfusion - ex.  Angina  - Evaluate fluid balance, assess for edema, trend weights  Outcome: Progressing     Problem: METABOLIC/FLUID AND ELECTROLYTES - ADULT  Goal: Electrolytes maintained within normal limits  Description: INTERVENTIONS:  - Monitor labs and rhythm and assess patient for signs and symptoms of electrolyte imbalances  - Administer electrolyte replacement as ordered  - Monitor response to electrolyte replacements, including rhythm and repeat lab results as appropriate  - Fluid restriction as ordered  - Instruct patient on fluid and nutrition restrictions as appropriate  Outcome: Progressing

## 2023-11-19 NOTE — PLAN OF CARE
Patient weaned off O2 and encouraged to ambulate. Will DC when medically cleared. Call light at bedside and safety measures in place. Problem: Patient Centered Care  Goal: Patient preferences are identified and integrated in the patient's plan of care  Description: Interventions:  - What would you like us to know as we care for you?  Home with family  - Provide timely, complete, and accurate information to patient/family  - Incorporate patient and family knowledge, values, beliefs, and cultural backgrounds into the planning and delivery of care  - Encourage patient/family to participate in care and decision-making at the level they choose  - Honor patient and family perspectives and choices  Outcome: Progressing     Problem: RESPIRATORY - ADULT  Goal: Achieves optimal ventilation and oxygenation  Description: INTERVENTIONS:  - Assess for changes in respiratory status  - Assess for changes in mentation and behavior  - Position to facilitate oxygenation and minimize respiratory effort  - Oxygen supplementation based on oxygen saturation or ABGs  - Provide Smoking Cessation handout, if applicable  - Encourage broncho-pulmonary hygiene including cough, deep breathe, Incentive Spirometry  - Assess the need for suctioning and perform as needed  - Assess and instruct to report SOB or any respiratory difficulty  - Respiratory Therapy support as indicated  - Manage/alleviate anxiety  - Monitor for signs/symptoms of CO2 retention  Outcome: Progressing     Problem: CARDIOVASCULAR - ADULT  Goal: Maintains optimal cardiac output and hemodynamic stability  Description: INTERVENTIONS:  - Monitor vital signs, rhythm, and trends  - Monitor for bleeding, hypotension and signs of decreased cardiac output  - Evaluate effectiveness of vasoactive medications to optimize hemodynamic stability  - Monitor arterial and/or venous puncture sites for bleeding and/or hematoma  - Assess quality of pulses, skin color and temperature  - Assess for signs of decreased coronary artery perfusion - ex.  Angina  - Evaluate fluid balance, assess for edema, trend weights  Outcome: Progressing     Problem: METABOLIC/FLUID AND ELECTROLYTES - ADULT  Goal: Electrolytes maintained within normal limits  Description: INTERVENTIONS:  - Monitor labs and rhythm and assess patient for signs and symptoms of electrolyte imbalances  - Administer electrolyte replacement as ordered  - Monitor response to electrolyte replacements, including rhythm and repeat lab results as appropriate  - Fluid restriction as ordered  - Instruct patient on fluid and nutrition restrictions as appropriate  Outcome: Progressing

## 2023-11-19 NOTE — CM/SW NOTE
11/19/23 1500   CM/SW Referral Data   Referral Source Social Work (self-referral)   Reason for Referral Discharge planning   Informant Son   Medical Hx   Does patient have an established PCP? Yes   Patient Info   Patient's Current Mental Status at Time of Assessment Oriented; Alert   Patient's [address]   Number of Levels in Home 1   Patient lives with Son   Patient Status Prior to Admission   Independent with ADLs and Mobility No   Pt. requires assistance with Housework;Driving;Meals; Bathing; Ambulating;Dressing;Finances; Toileting;Feeding;Medications   Services in place prior to admission Home Health Care;DME/Supplies at home   [address] [facility] [address]  [address] [place]   Type of DME/Supplies Wheeled Walker;Straight [facility]eli [facility]     Social work was able to meet with the patient and his son at bedside to discuss discharge planning. The patient lives with his son in a 1 level home. The patient uses a walker at baseline. The patient requires assistance with all ADLs. Social work provided the patient and his son the [facility] list.  The patient's son notified social work that he is current with a [facility] agency call [employer] [phone]. Social work called to Per Lombardo a fax number but did not get an answer. Social work will follow up with [facility] agency on 11/20 and send referral.     Junie Montoya was also contacted to restart homemaker services. SW/CM to remain available for support and/or discharge planning.      Gabriele Letters MSW, [facility]  Discharge Planner [id number]

## 2023-11-19 NOTE — PLAN OF CARE
Pt briefly converted into A-fib, currently NS; echo ordered for this AM.     Problem: Patient Centered Care  Goal: Patient preferences are identified and integrated in the patient's plan of care  Description: Interventions:  - What would you like us to know as we care for you?  Home with family  - Provide timely, complete, and accurate information to patient/family  - Incorporate patient and family knowledge, values, beliefs, and cultural backgrounds into the planning and delivery of care  - Encourage patient/family to participate in care and decision-making at the level they choose  - Honor patient and family perspectives and choices  Outcome: Progressing     Problem: Patient/Family Goals  Goal: Patient/Family Long Term Goal  Description: Patient's Long Term Goal: to get stronger    Interventions:  - PT/OT  - See additional Care Plan goals for specific interventions  Outcome: Progressing  Goal: Patient/Family Short Term Goal  Description: Patient's Short Term Goal: to breathe better    Interventions:   - Monitor vital signs  - See additional Care Plan goals for specific interventions  Outcome: Progressing     Problem: RESPIRATORY - ADULT  Goal: Achieves optimal ventilation and oxygenation  Description: INTERVENTIONS:  - Assess for changes in respiratory status  - Assess for changes in mentation and behavior  - Position to facilitate oxygenation and minimize respiratory effort  - Oxygen supplementation based on oxygen saturation or ABGs  - Provide Smoking Cessation handout, if applicable  - Encourage broncho-pulmonary hygiene including cough, deep breathe, Incentive Spirometry  - Assess the need for suctioning and perform as needed  - Assess and instruct to report SOB or any respiratory difficulty  - Respiratory Therapy support as indicated  - Manage/alleviate anxiety  - Monitor for signs/symptoms of CO2 retention  Outcome: Progressing     Problem: CARDIOVASCULAR - ADULT  Goal: Maintains optimal cardiac output and hemodynamic stability  Description: INTERVENTIONS:  - Monitor vital signs, rhythm, and trends  - Monitor for bleeding, hypotension and signs of decreased cardiac output  - Evaluate effectiveness of vasoactive medications to optimize hemodynamic stability  - Monitor arterial and/or venous puncture sites for bleeding and/or hematoma  - Assess quality of pulses, skin color and temperature  - Assess for signs of decreased coronary artery perfusion - ex.  Angina  - Evaluate fluid balance, assess for edema, trend weights  Outcome: Progressing     Problem: METABOLIC/FLUID AND ELECTROLYTES - ADULT  Goal: Electrolytes maintained within normal limits  Description: INTERVENTIONS:  - Monitor labs and rhythm and assess patient for signs and symptoms of electrolyte imbalances  - Administer electrolyte replacement as ordered  - Monitor response to electrolyte replacements, including rhythm and repeat lab results as appropriate  - Fluid restriction as ordered  - Instruct patient on fluid and nutrition restrictions as appropriate  Outcome: Progressing     Problem: SKIN/TISSUE INTEGRITY - ADULT  Goal: Skin integrity remains intact  Description: INTERVENTIONS  - Assess and document risk factors for pressure ulcer development  - Assess and document skin integrity  - Monitor for areas of redness and/or skin breakdown  - Initiate interventions, skin care algorithm/standards of care as needed  Outcome: Progressing

## 2023-11-20 VITALS
SYSTOLIC BLOOD PRESSURE: 147 MMHG | BODY MASS INDEX: 22.7 KG/M2 | WEIGHT: 115.63 LBS | TEMPERATURE: 98 F | DIASTOLIC BLOOD PRESSURE: 80 MMHG | HEIGHT: 60 IN | HEART RATE: 95 BPM | OXYGEN SATURATION: 94 % | RESPIRATION RATE: 16 BRPM

## 2023-11-20 LAB
ANION GAP SERPL CALC-SCNC: 4 MMOL/L (ref 0–18)
BASOPHILS # BLD AUTO: 0.01 X10(3) UL (ref 0–0.2)
BASOPHILS NFR BLD AUTO: 0.1 %
BUN BLD-MCNC: 26 MG/DL (ref 9–23)
BUN/CREAT SERPL: 28.3 (ref 10–20)
CALCIUM BLD-MCNC: 8.7 MG/DL (ref 8.7–10.4)
CHLORIDE SERPL-SCNC: 100 MMOL/L (ref 98–112)
CO2 SERPL-SCNC: 28 MMOL/L (ref 21–32)
CREAT BLD-MCNC: 0.92 MG/DL
DEPRECATED RDW RBC AUTO: 41.1 FL (ref 35.1–46.3)
EGFRCR SERPLBLD CKD-EPI 2021: 74 ML/MIN/1.73M2 (ref 60–?)
EOSINOPHIL # BLD AUTO: 0 X10(3) UL (ref 0–0.7)
EOSINOPHIL NFR BLD AUTO: 0 %
ERYTHROCYTE [DISTWIDTH] IN BLOOD BY AUTOMATED COUNT: 12.9 % (ref 11–15)
GLUCOSE BLD-MCNC: 105 MG/DL (ref 70–99)
HCT VFR BLD AUTO: 31.6 %
HGB BLD-MCNC: 10.4 G/DL
IMM GRANULOCYTES # BLD AUTO: 0.07 X10(3) UL (ref 0–1)
IMM GRANULOCYTES NFR BLD: 0.8 %
LYMPHOCYTES # BLD AUTO: 1.91 X10(3) UL (ref 1–4)
LYMPHOCYTES NFR BLD AUTO: 20.7 %
MAGNESIUM SERPL-MCNC: 1.8 MG/DL (ref 1.6–2.6)
MCH RBC QN AUTO: 28.7 PG (ref 26–34)
MCHC RBC AUTO-ENTMCNC: 32.9 G/DL (ref 31–37)
MCV RBC AUTO: 87.1 FL
MONOCYTES # BLD AUTO: 1.14 X10(3) UL (ref 0.1–1)
MONOCYTES NFR BLD AUTO: 12.4 %
NEUTROPHILS # BLD AUTO: 6.1 X10 (3) UL (ref 1.5–7.7)
NEUTROPHILS # BLD AUTO: 6.1 X10(3) UL (ref 1.5–7.7)
NEUTROPHILS NFR BLD AUTO: 66 %
OSMOLALITY SERPL CALC.SUM OF ELEC: 279 MOSM/KG (ref 275–295)
PLATELET # BLD AUTO: 241 10(3)UL (ref 150–450)
POTASSIUM SERPL-SCNC: 4.2 MMOL/L (ref 3.5–5.1)
RBC # BLD AUTO: 3.63 X10(6)UL
SODIUM SERPL-SCNC: 132 MMOL/L (ref 136–145)
WBC # BLD AUTO: 9.2 X10(3) UL (ref 4–11)

## 2023-11-20 PROCEDURE — 94640 AIRWAY INHALATION TREATMENT: CPT

## 2023-11-20 PROCEDURE — 85025 COMPLETE CBC W/AUTO DIFF WBC: CPT | Performed by: HOSPITALIST

## 2023-11-20 PROCEDURE — 80048 BASIC METABOLIC PNL TOTAL CA: CPT | Performed by: HOSPITALIST

## 2023-11-20 PROCEDURE — 97530 THERAPEUTIC ACTIVITIES: CPT

## 2023-11-20 PROCEDURE — 94799 UNLISTED PULMONARY SVC/PX: CPT

## 2023-11-20 PROCEDURE — 83735 ASSAY OF MAGNESIUM: CPT | Performed by: HOSPITALIST

## 2023-11-20 RX ORDER — HEPARIN SODIUM (PORCINE) LOCK FLUSH IV SOLN 100 UNIT/ML 100 UNIT/ML
500 SOLUTION INTRAVENOUS ONCE
Status: COMPLETED | OUTPATIENT
Start: 2023-11-20 | End: 2023-11-20

## 2023-11-20 RX ORDER — PREDNISONE 20 MG/1
40 TABLET ORAL
Qty: 4 TABLET | Refills: 0 | Status: SHIPPED | OUTPATIENT
Start: 2023-11-21 | End: 2023-11-24

## 2023-11-20 RX ORDER — MAGNESIUM SULFATE HEPTAHYDRATE 40 MG/ML
2 INJECTION, SOLUTION INTRAVENOUS ONCE
Status: DISCONTINUED | OUTPATIENT
Start: 2023-11-20 | End: 2023-11-20

## 2023-11-20 RX ORDER — AMOXICILLIN AND CLAVULANATE POTASSIUM 875; 125 MG/1; MG/1
1 TABLET, FILM COATED ORAL 2 TIMES DAILY
Qty: 4 TABLET | Refills: 0 | Status: SHIPPED | OUTPATIENT
Start: 2023-11-20 | End: 2023-11-22

## 2023-11-20 RX ORDER — DILTIAZEM HYDROCHLORIDE 120 MG/1
120 CAPSULE, EXTENDED RELEASE ORAL DAILY
Qty: 30 CAPSULE | Refills: 11 | Status: SHIPPED | OUTPATIENT
Start: 2023-11-21 | End: 2024-11-20

## 2023-11-20 RX ORDER — MAGNESIUM SULFATE HEPTAHYDRATE 40 MG/ML
2 INJECTION, SOLUTION INTRAVENOUS ONCE
Status: COMPLETED | OUTPATIENT
Start: 2023-11-20 | End: 2023-11-20

## 2023-11-20 NOTE — CM/SW NOTE
11/20/23 1538   Discharge disposition   Expected discharge disposition Home-Health   Post Acute Care Provider   ([address] ,)   Discharge transportation [address] [facility]     Pt discussed during nursing rounds. Pt is stable for dc today. MD dc order entered. [address]  will resume RN and PT services at [facility], agency notified of dc home today. Pt's son agreeable w/transfer and is requesting assist w/transport home, cost of transport will be covered by Medicaid. [address] [facility] scheduled for 5:45pm . Plan: Home w/son with [address] today. / to remain available for support and/or discharge planning.      CHRISTY CamachoN    [phone]

## 2023-11-20 NOTE — PHYSICAL THERAPY NOTE
PHYSICAL THERAPY TREATMENT NOTE - INPATIENT     Room Number: 329/329-A       Presenting Problem: Pneumonia  Co-Morbidities : advanced age 81 y/o    Problem List  Principal Problem:    Community acquired pneumonia of right lower lobe of lung  Active Problems:    Aspiration pneumonitis ([address]      PHYSICAL THERAPY ASSESSMENT     RN approved participation with physical therapy. Pt was received sitting in recliner and agreeable to activity. Son at bedside and provided translation however pt follows commands in [place]. Educated on role of PT and PT plan of care, goals for this session. Pt verbalized understanding. pt was visibly SOB with increased work of breathing throughout session on RA; SPO2 remained stable with activity (94%). Transfers: SBA for STS from chair. VC given for safe hand placement however pt not following safety instructions and pulls to stand with RW, while pt's son holds RW in place. He is able to stand with SBA on all attempts this date. Gait: ambulated 70 ft x 2 with RW and CGA. Slow, shuffled gait, narrow OLLIE, forward flexed posture with VC to correct. Overall steady gait with no LOB. Took 5 minute seated rest break after 70 ft. Education provided on pacing techniques and taking rest breaks as needed, using RW at all times for energy conservation. Per discussion with pt and son, no further questions or concerns about safe mobility and DC home likely today. Pt was left sitting in chair with needs within reach, son at bedside, handoff to RN complete. The patient's Approx Degree of Impairment: 50.57% has been calculated based on documentation in the [facility] '6 clicks' Inpatient Basic Mobility Short Form.   Research supports that patients with this level of impairment may benefit from ILANA however pt has supportive family who plan to take pt home with [facility] PT.    DISCHARGE RECOMMENDATIONS  PT Discharge Recommendations: 24 hour care/supervision;Home with home health PT     PLAN  PT Treatment Plan: Bed mobility; Body mechanics; Endurance; Energy conservation;Patient education;Gait training;Strengthening;Transfer training;Balance training;Family education  Frequency (Obs): 3-5x/week    SUBJECTIVE  Agreeable to activity. OBJECTIVE  Precautions: Bed/chair alarm    WEIGHT BEARING RESTRICTION  none    PAIN ASSESSMENT   Ratin  Location: throat  Management Techniques: Activity promotion; Body mechanics;Breathing techniques;Relaxation;Repositioning    BALANCE  Static Sitting: Good  Dynamic Sitting: Fair +  Static Standing: Fair  Dynamic Standing: Fair -    ACTIVITY TOLERANCE  Pulse: 95  Heart Rate Source: Monitor  BP: (!) 166/83  BP Location: Left arm  BP Method: Automatic  Patient Position: Sitting     O2 WALK  Oxygen Therapy  SPO2% Ambulation on Room Air: 94    AM-PAC '6-Clicks' INPATIENT SHORT FORM - BASIC MOBILITY  How much difficulty does the patient currently have. .. Patient Difficulty: Turning over in bed (including adjusting bedclothes, sheets and blankets)?: A Little   Patient Difficulty: Sitting down on and standing up from a chair with arms (e.g., wheelchair, bedside commode, etc.): A Little   Patient Difficulty: Moving from lying on back to sitting on the side of the bed?: A Little   How much help from another person does the patient currently need. .. Help from Another: Moving to and from a bed to a chair (including a wheelchair)?: A Little   Help from Another: Need to walk in hospital room?: A Little   Help from Another: Climbing 3-5 steps with a railing?: A Lot     AM-PAC Score:  Raw Score: 17   Approx Degree of Impairment: 50.57%   Standardized Score (AM-PAC Scale): 42.13   CMS Modifier (G-Code): CK    FUNCTIONAL ABILITY STATUS  Functional Mobility/Gait Assessment  Gait Assistance: Contact guard assist  Distance (ft): 70 x 2  Assistive Device: Rolling walker  Pattern: Shuffle (slow pace, flexed posture, step-to pattern)    Patient End of Session: Up in chair;Call light within reach; Needs met;RN aware of session/findings; All patient questions and concerns addressed; Family present    CURRENT GOALS   Goals to be met by: 11/30/2023  Patient Goal Patient's self-stated goal is: Return home    Goal #1 Patient is able to demonstrate supine - sit EOB @ level: independent      Goal #1   Current Status  NT   Goal #2 Patient is able to demonstrate transfers Sit to/from Stand at assistance level: modified independent with walker - rolling      Goal #2  Current Status SBA with RW    Goal #3 Patient is able to ambulate 150 feet with assist device: walker - rolling at assistance level: modified independent   Goal #3   Current Status 70 ft x 2 with RW and CGA   Goal #4 Patient will negotiate 3 stairs/one curb w/ assistive device and supervision   Goal #4   Current Status  NT   Goal #5 Patient to demonstrate independence with home activity/exercise instructions provided to patient in preparation for discharge.    Goal #5   Current Status  in progress   Goal #6     Goal #6  Current Status         Therapeutic Activity: 30 minutes

## 2023-11-20 NOTE — DISCHARGE PLANNING
Patient was provided with discharge instructions, education, and follow up information. Patient's son, Dean Huerta, present for discharge instructions with patient's consent. Prescriptions were already sent electronically to patient's pharmacy. Patient verbalizes understanding of follow up information, specifically following up with PCP and cardiology. Patient has no questions after reviewing all instructions and will be going home. [facility] picking up patient at 5:45pm as set up by .      Payal Hammond RN, Discharge Leader [id number]

## 2023-11-20 NOTE — DISCHARGE INSTRUCTIONS
Start steroid and antibiotic on 11/21/2023 as she got her dose today prior to discharge. Please get BMP and CBC with home health and 3 to 5 days with results to Dr. Sasha Rizzo with primary care provider within 1 week     Sometimes managing your health at home requires assistance. The [facility] team has recognized your preference to use   [address], Phone: [phone]. A representative from the home health agency will contact you or your family to schedule your first visit.

## 2023-11-20 NOTE — CM/SW NOTE
CM confirmed that pt is current w/[facility] for PT services only, no RN services available. Pt's son requested that [facility] referral be sent to [facility] who he has been looking into prior to pt's admission to [facility]. SW had already sent [facility] referrals in [address] [place] [address] on 11/17. [facility] referral search reopened in [address] [place] [address], [facility] added to referral search. [facility] has also accepted for RN and PT services at [address]: Liaison Corrinne Prayer at [facility] notified CM that patient was discharged from [place] [address] on 11/17/2023. [address] [facility] [address] [facility]  [place], [address]  PHONE: [phone]     Plan: Home w/son and [facility] pending agency choice and medical clearance.     CHRISTY MatthewN    [phone]

## 2023-11-20 NOTE — PLAN OF CARE
Patient seen by Pulmonology and cleared for DC. All other doctors cleared to DC. Will leave by Chaz Green and Son. Problem: Patient Centered Care  Goal: Patient preferences are identified and integrated in the patient's plan of care  Description: Interventions:  - What would you like us to know as we care for you?  Home with family  - Provide timely, complete, and accurate information to patient/family  - Incorporate patient and family knowledge, values, beliefs, and cultural backgrounds into the planning and delivery of care  - Encourage patient/family to participate in care and decision-making at the level they choose  - Honor patient and family perspectives and choices  Outcome: Completed     Problem: Patient/Family Goals  Goal: Patient/Family Long Term Goal  Description: Patient's Long Term Goal: to get stronger    Interventions:  - PT/OT  - See additional Care Plan goals for specific interventions  Outcome: Completed  Goal: Patient/Family Short Term Goal  Description: Patient's Short Term Goal: to breathe better    Interventions:   - Monitor vital signs  - See additional Care Plan goals for specific interventions  Outcome: Completed     Problem: RESPIRATORY - ADULT  Goal: Achieves optimal ventilation and oxygenation  Description: INTERVENTIONS:  - Assess for changes in respiratory status  - Assess for changes in mentation and behavior  - Position to facilitate oxygenation and minimize respiratory effort  - Oxygen supplementation based on oxygen saturation or ABGs  - Provide Smoking Cessation handout, if applicable  - Encourage broncho-pulmonary hygiene including cough, deep breathe, Incentive Spirometry  - Assess the need for suctioning and perform as needed  - Assess and instruct to report SOB or any respiratory difficulty  - Respiratory Therapy support as indicated  - Manage/alleviate anxiety  - Monitor for signs/symptoms of CO2 retention  Outcome: Completed     Problem: CARDIOVASCULAR - ADULT  Goal: Maintains optimal cardiac output and hemodynamic stability  Description: INTERVENTIONS:  - Monitor vital signs, rhythm, and trends  - Monitor for bleeding, hypotension and signs of decreased cardiac output  - Evaluate effectiveness of vasoactive medications to optimize hemodynamic stability  - Monitor arterial and/or venous puncture sites for bleeding and/or hematoma  - Assess quality of pulses, skin color and temperature  - Assess for signs of decreased coronary artery perfusion - ex.  Angina  - Evaluate fluid balance, assess for edema, trend weights  Outcome: Completed     Problem: METABOLIC/FLUID AND ELECTROLYTES - ADULT  Goal: Electrolytes maintained within normal limits  Description: INTERVENTIONS:  - Monitor labs and rhythm and assess patient for signs and symptoms of electrolyte imbalances  - Administer electrolyte replacement as ordered  - Monitor response to electrolyte replacements, including rhythm and repeat lab results as appropriate  - Fluid restriction as ordered  - Instruct patient on fluid and nutrition restrictions as appropriate  Outcome: Completed

## 2023-11-20 NOTE — PLAN OF CARE
Patient is aox3. Room air. IV abx. Discharge planning in place. Problem: Patient Centered Care  Goal: Patient preferences are identified and integrated in the patient's plan of care  Description: Interventions:  - What would you like us to know as we care for you?  Home with family  - Provide timely, complete, and accurate information to patient/family  - Incorporate patient and family knowledge, values, beliefs, and cultural backgrounds into the planning and delivery of care  - Encourage patient/family to participate in care and decision-making at the level they choose  - Honor patient and family perspectives and choices  Outcome: Progressing     Problem: Patient/Family Goals  Goal: Patient/Family Long Term Goal  Description: Patient's Long Term Goal: to get stronger    Interventions:  - PT/OT  - See additional Care Plan goals for specific interventions  Outcome: Progressing  Goal: Patient/Family Short Term Goal  Description: Patient's Short Term Goal: to breathe better    Interventions:   - Monitor vital signs  - See additional Care Plan goals for specific interventions  Outcome: Progressing     Problem: RESPIRATORY - ADULT  Goal: Achieves optimal ventilation and oxygenation  Description: INTERVENTIONS:  - Assess for changes in respiratory status  - Assess for changes in mentation and behavior  - Position to facilitate oxygenation and minimize respiratory effort  - Oxygen supplementation based on oxygen saturation or ABGs  - Provide Smoking Cessation handout, if applicable  - Encourage broncho-pulmonary hygiene including cough, deep breathe, Incentive Spirometry  - Assess the need for suctioning and perform as needed  - Assess and instruct to report SOB or any respiratory difficulty  - Respiratory Therapy support as indicated  - Manage/alleviate anxiety  - Monitor for signs/symptoms of CO2 retention  Outcome: Progressing     Problem: CARDIOVASCULAR - ADULT  Goal: Maintains optimal cardiac output and hemodynamic stability  Description: INTERVENTIONS:  - Monitor vital signs, rhythm, and trends  - Monitor for bleeding, hypotension and signs of decreased cardiac output  - Evaluate effectiveness of vasoactive medications to optimize hemodynamic stability  - Monitor arterial and/or venous puncture sites for bleeding and/or hematoma  - Assess quality of pulses, skin color and temperature  - Assess for signs of decreased coronary artery perfusion - ex.  Angina  - Evaluate fluid balance, assess for edema, trend weights  Outcome: Progressing     Problem: METABOLIC/FLUID AND ELECTROLYTES - ADULT  Goal: Electrolytes maintained within normal limits  Description: INTERVENTIONS:  - Monitor labs and rhythm and assess patient for signs and symptoms of electrolyte imbalances  - Administer electrolyte replacement as ordered  - Monitor response to electrolyte replacements, including rhythm and repeat lab results as appropriate  - Fluid restriction as ordered  - Instruct patient on fluid and nutrition restrictions as appropriate  Outcome: Progressing     Problem: SKIN/TISSUE INTEGRITY - ADULT  Goal: Skin integrity remains intact  Description: INTERVENTIONS  - Assess and document risk factors for pressure ulcer development  - Assess and document skin integrity  - Monitor for areas of redness and/or skin breakdown  - Initiate interventions, skin care algorithm/standards of care as needed  Outcome: Progressing

## 2023-12-09 ENCOUNTER — HOSPITAL ENCOUNTER (EMERGENCY)
Facility: HOSPITAL | Age: 88
Discharge: HOME | End: 2023-12-09
Attending: EMERGENCY MEDICINE
Payer: MEDICARE

## 2023-12-09 ENCOUNTER — APPOINTMENT (OUTPATIENT)
Dept: GENERAL RADIOLOGY | Facility: HOSPITAL | Age: 88
End: 2023-12-09
Attending: EMERGENCY MEDICINE
Payer: MEDICARE

## 2023-12-09 VITALS
SYSTOLIC BLOOD PRESSURE: 143 MMHG | OXYGEN SATURATION: 99 % | DIASTOLIC BLOOD PRESSURE: 74 MMHG | TEMPERATURE: 98 F | BODY MASS INDEX: 21.99 KG/M2 | WEIGHT: 112 LBS | HEIGHT: 60 IN | RESPIRATION RATE: 22 BRPM | HEART RATE: 74 BPM

## 2023-12-09 DIAGNOSIS — R07.9 CHEST PAIN OF UNCERTAIN ETIOLOGY: Primary | ICD-10-CM

## 2023-12-09 LAB
ALBUMIN SERPL-MCNC: 3.6 G/DL (ref 3.2–4.8)
ALBUMIN/GLOB SERPL: 1.2 {RATIO} (ref 1–2)
ALP LIVER SERPL-CCNC: 76 U/L
ALT SERPL-CCNC: 17 U/L
ANION GAP SERPL CALC-SCNC: 4 MMOL/L (ref 0–18)
APTT PPP: 117 SECONDS (ref 23.3–35.6)
AST SERPL-CCNC: 16 U/L (ref ?–34)
ATRIAL RATE: 86 BPM
BASOPHILS # BLD AUTO: 0.03 X10(3) UL (ref 0–0.2)
BASOPHILS NFR BLD AUTO: 0.5 %
BILIRUB SERPL-MCNC: 0.3 MG/DL (ref 0.2–0.9)
BNP SERPL-MCNC: 137 PG/ML
BUN BLD-MCNC: 19 MG/DL (ref 9–23)
BUN/CREAT SERPL: 19.6 (ref 10–20)
CALCIUM BLD-MCNC: 9 MG/DL (ref 8.7–10.4)
CHLORIDE SERPL-SCNC: 98 MMOL/L (ref 98–112)
CO2 SERPL-SCNC: 28 MMOL/L (ref 21–32)
CREAT BLD-MCNC: 0.97 MG/DL
DEPRECATED RDW RBC AUTO: 41.7 FL (ref 35.1–46.3)
EGFRCR SERPLBLD CKD-EPI 2021: 70 ML/MIN/1.73M2 (ref 60–?)
EOSINOPHIL # BLD AUTO: 0.08 X10(3) UL (ref 0–0.7)
EOSINOPHIL NFR BLD AUTO: 1.4 %
ERYTHROCYTE [DISTWIDTH] IN BLOOD BY AUTOMATED COUNT: 13.2 % (ref 11–15)
GLOBULIN PLAS-MCNC: 2.9 G/DL (ref 2.8–4.4)
GLUCOSE BLD-MCNC: 148 MG/DL (ref 70–99)
HCT VFR BLD AUTO: 30.6 %
HGB BLD-MCNC: 10.1 G/DL
IMM GRANULOCYTES # BLD AUTO: 0.04 X10(3) UL (ref 0–1)
IMM GRANULOCYTES NFR BLD: 0.7 %
INR BLD: 1 (ref 0.8–1.2)
LYMPHOCYTES # BLD AUTO: 1.64 X10(3) UL (ref 1–4)
LYMPHOCYTES NFR BLD AUTO: 27.9 %
MCH RBC QN AUTO: 28.6 PG (ref 26–34)
MCHC RBC AUTO-ENTMCNC: 33 G/DL (ref 31–37)
MCV RBC AUTO: 86.7 FL
MONOCYTES # BLD AUTO: 0.74 X10(3) UL (ref 0.1–1)
MONOCYTES NFR BLD AUTO: 12.6 %
NEUTROPHILS # BLD AUTO: 3.35 X10 (3) UL (ref 1.5–7.7)
NEUTROPHILS # BLD AUTO: 3.35 X10(3) UL (ref 1.5–7.7)
NEUTROPHILS NFR BLD AUTO: 56.9 %
OSMOLALITY SERPL CALC.SUM OF ELEC: 275 MOSM/KG (ref 275–295)
P AXIS: 59 DEGREES
P-R INTERVAL: 252 MS
PLATELET # BLD AUTO: 330 10(3)UL (ref 150–450)
POTASSIUM SERPL-SCNC: 4.2 MMOL/L (ref 3.5–5.1)
PROT SERPL-MCNC: 6.5 G/DL (ref 5.7–8.2)
PROTHROMBIN TIME: 13.8 SECONDS (ref 11.6–14.8)
Q-T INTERVAL: 386 MS
QRS DURATION: 116 MS
QTC CALCULATION (BEZET): 461 MS
R AXIS: 110 DEGREES
RBC # BLD AUTO: 3.53 X10(6)UL
SODIUM SERPL-SCNC: 130 MMOL/L (ref 136–145)
T AXIS: 37 DEGREES
TROPONIN I SERPL HS-MCNC: 10 NG/L
TROPONIN I SERPL HS-MCNC: 9 NG/L
VENTRICULAR RATE: 86 BPM
WBC # BLD AUTO: 5.9 X10(3) UL (ref 4–11)

## 2023-12-09 PROCEDURE — 80053 COMPREHEN METABOLIC PANEL: CPT | Performed by: EMERGENCY MEDICINE

## 2023-12-09 PROCEDURE — 84484 ASSAY OF TROPONIN QUANT: CPT | Performed by: EMERGENCY MEDICINE

## 2023-12-09 PROCEDURE — 99285 EMERGENCY DEPT VISIT HI MDM: CPT

## 2023-12-09 PROCEDURE — 85730 THROMBOPLASTIN TIME PARTIAL: CPT | Performed by: EMERGENCY MEDICINE

## 2023-12-09 PROCEDURE — 85025 COMPLETE CBC W/AUTO DIFF WBC: CPT | Performed by: EMERGENCY MEDICINE

## 2023-12-09 PROCEDURE — 71045 X-RAY EXAM CHEST 1 VIEW: CPT | Performed by: EMERGENCY MEDICINE

## 2023-12-09 PROCEDURE — 99284 EMERGENCY DEPT VISIT MOD MDM: CPT

## 2023-12-09 PROCEDURE — 83880 ASSAY OF NATRIURETIC PEPTIDE: CPT | Performed by: EMERGENCY MEDICINE

## 2023-12-09 PROCEDURE — 93005 ELECTROCARDIOGRAM TRACING: CPT

## 2023-12-09 PROCEDURE — 93010 ELECTROCARDIOGRAM REPORT: CPT

## 2023-12-09 PROCEDURE — 36415 COLL VENOUS BLD VENIPUNCTURE: CPT

## 2023-12-09 PROCEDURE — 85610 PROTHROMBIN TIME: CPT | Performed by: EMERGENCY MEDICINE

## 2023-12-09 RX ORDER — HEPARIN SODIUM (PORCINE) LOCK FLUSH IV SOLN 100 UNIT/ML 100 UNIT/ML
5 SOLUTION INTRAVENOUS ONCE
Status: COMPLETED | OUTPATIENT
Start: 2023-12-09 | End: 2023-12-09

## 2023-12-09 RX ORDER — ASPIRIN 81 MG/1
324 TABLET, CHEWABLE ORAL ONCE
Status: COMPLETED | OUTPATIENT
Start: 2023-12-09 | End: 2023-12-09

## 2024-02-07 ENCOUNTER — HOSPITAL ENCOUNTER (INPATIENT)
Facility: HOSPITAL | Age: 89
LOS: 2 days | Discharge: HOME | End: 2024-02-09
Attending: EMERGENCY MEDICINE | Admitting: INTERNAL MEDICINE
Payer: MEDICARE

## 2024-02-07 ENCOUNTER — APPOINTMENT (OUTPATIENT)
Dept: CT IMAGING | Facility: HOSPITAL | Age: 89
End: 2024-02-07
Attending: EMERGENCY MEDICINE
Payer: MEDICARE

## 2024-02-07 ENCOUNTER — APPOINTMENT (OUTPATIENT)
Dept: CT IMAGING | Facility: HOSPITAL | Age: 89
End: 2024-02-07
Attending: Other
Payer: MEDICARE

## 2024-02-07 ENCOUNTER — APPOINTMENT (OUTPATIENT)
Dept: MRI IMAGING | Facility: HOSPITAL | Age: 89
End: 2024-02-07
Attending: EMERGENCY MEDICINE
Payer: MEDICARE

## 2024-02-07 DIAGNOSIS — I63.9 ACUTE CVA (CEREBROVASCULAR ACCIDENT) (HCC): Primary | ICD-10-CM

## 2024-02-07 LAB
ALBUMIN SERPL-MCNC: 3.6 G/DL (ref 3.2–4.8)
ALBUMIN/GLOB SERPL: 1.3 {RATIO} (ref 1–2)
ALP LIVER SERPL-CCNC: 102 U/L
ALT SERPL-CCNC: 12 U/L
ANION GAP SERPL CALC-SCNC: 4 MMOL/L (ref 0–18)
AST SERPL-CCNC: 21 U/L (ref ?–34)
ATRIAL RATE: 57 BPM
BASOPHILS # BLD AUTO: 0.03 X10(3) UL (ref 0–0.2)
BASOPHILS NFR BLD AUTO: 0.4 %
BILIRUB SERPL-MCNC: 0.2 MG/DL (ref 0.2–0.9)
BILIRUB UR QL: NEGATIVE
BUN BLD-MCNC: 24 MG/DL (ref 9–23)
BUN/CREAT SERPL: 25 (ref 10–20)
CALCIUM BLD-MCNC: 8.5 MG/DL (ref 8.7–10.4)
CHLORIDE SERPL-SCNC: 100 MMOL/L (ref 98–112)
CLARITY UR: CLEAR
CO2 SERPL-SCNC: 27 MMOL/L (ref 21–32)
CREAT BLD-MCNC: 0.96 MG/DL
DEPRECATED RDW RBC AUTO: 42.9 FL (ref 35.1–46.3)
EGFRCR SERPLBLD CKD-EPI 2021: 71 ML/MIN/1.73M2 (ref 60–?)
EOSINOPHIL # BLD AUTO: 0.06 X10(3) UL (ref 0–0.7)
EOSINOPHIL NFR BLD AUTO: 0.9 %
ERYTHROCYTE [DISTWIDTH] IN BLOOD BY AUTOMATED COUNT: 13.3 % (ref 11–15)
GLOBULIN PLAS-MCNC: 2.8 G/DL (ref 2.8–4.4)
GLUCOSE BLD-MCNC: 106 MG/DL (ref 70–99)
GLUCOSE BLDC GLUCOMTR-MCNC: 124 MG/DL (ref 70–99)
GLUCOSE UR-MCNC: NORMAL MG/DL
HCT VFR BLD AUTO: 31.9 %
HGB BLD-MCNC: 10.5 G/DL
HGB UR QL STRIP.AUTO: NEGATIVE
IMM GRANULOCYTES # BLD AUTO: 0.02 X10(3) UL (ref 0–1)
IMM GRANULOCYTES NFR BLD: 0.3 %
KETONES UR-MCNC: NEGATIVE MG/DL
LEUKOCYTE ESTERASE UR QL STRIP.AUTO: NEGATIVE
LYMPHOCYTES # BLD AUTO: 2.01 X10(3) UL (ref 1–4)
LYMPHOCYTES NFR BLD AUTO: 30.1 %
MCH RBC QN AUTO: 28.8 PG (ref 26–34)
MCHC RBC AUTO-ENTMCNC: 32.9 G/DL (ref 31–37)
MCV RBC AUTO: 87.6 FL
MONOCYTES # BLD AUTO: 0.78 X10(3) UL (ref 0.1–1)
MONOCYTES NFR BLD AUTO: 11.7 %
NEUTROPHILS # BLD AUTO: 3.78 X10 (3) UL (ref 1.5–7.7)
NEUTROPHILS # BLD AUTO: 3.78 X10(3) UL (ref 1.5–7.7)
NEUTROPHILS NFR BLD AUTO: 56.6 %
NITRITE UR QL STRIP.AUTO: NEGATIVE
OSMOLALITY SERPL CALC.SUM OF ELEC: 276 MOSM/KG (ref 275–295)
P AXIS: 44 DEGREES
P-R INTERVAL: 222 MS
PH UR: 6.5 [PH] (ref 5–8)
PLATELET # BLD AUTO: 242 10(3)UL (ref 150–450)
POTASSIUM SERPL-SCNC: 5.4 MMOL/L (ref 3.5–5.1)
PROT SERPL-MCNC: 6.4 G/DL (ref 5.7–8.2)
PROT UR-MCNC: NEGATIVE MG/DL
Q-T INTERVAL: 446 MS
QRS DURATION: 114 MS
QTC CALCULATION (BEZET): 434 MS
R AXIS: 100 DEGREES
RBC # BLD AUTO: 3.64 X10(6)UL
SODIUM SERPL-SCNC: 131 MMOL/L (ref 136–145)
SP GR UR STRIP: 1.01 (ref 1–1.03)
T AXIS: 66 DEGREES
TROPONIN I SERPL HS-MCNC: 8 NG/L
UROBILINOGEN UR STRIP-ACNC: NORMAL
VENTRICULAR RATE: 57 BPM
WBC # BLD AUTO: 6.7 X10(3) UL (ref 4–11)

## 2024-02-07 PROCEDURE — 70450 CT HEAD/BRAIN W/O DYE: CPT | Performed by: EMERGENCY MEDICINE

## 2024-02-07 PROCEDURE — 72125 CT NECK SPINE W/O DYE: CPT | Performed by: EMERGENCY MEDICINE

## 2024-02-07 PROCEDURE — 70498 CT ANGIOGRAPHY NECK: CPT | Performed by: OTHER

## 2024-02-07 PROCEDURE — 70551 MRI BRAIN STEM W/O DYE: CPT | Performed by: EMERGENCY MEDICINE

## 2024-02-07 PROCEDURE — 70496 CT ANGIOGRAPHY HEAD: CPT | Performed by: OTHER

## 2024-02-07 RX ORDER — ONDANSETRON 2 MG/ML
4 INJECTION INTRAMUSCULAR; INTRAVENOUS EVERY 6 HOURS PRN
Status: DISCONTINUED | OUTPATIENT
Start: 2024-02-07 | End: 2024-02-09

## 2024-02-07 RX ORDER — HYDRALAZINE HYDROCHLORIDE 20 MG/ML
10 INJECTION INTRAMUSCULAR; INTRAVENOUS EVERY 2 HOUR PRN
Status: DISCONTINUED | OUTPATIENT
Start: 2024-02-07 | End: 2024-02-08

## 2024-02-07 RX ORDER — ASPIRIN 81 MG/1
81 TABLET, CHEWABLE ORAL ONCE
Status: COMPLETED | OUTPATIENT
Start: 2024-02-07 | End: 2024-02-07

## 2024-02-07 RX ORDER — CLOPIDOGREL BISULFATE 75 MG/1
75 TABLET ORAL ONCE
Status: COMPLETED | OUTPATIENT
Start: 2024-02-07 | End: 2024-02-07

## 2024-02-07 RX ORDER — ALBUTEROL SULFATE 2.5 MG/3ML
SOLUTION RESPIRATORY (INHALATION) EVERY 6 HOURS PRN
COMMUNITY

## 2024-02-07 RX ORDER — METOCLOPRAMIDE HYDROCHLORIDE 5 MG/ML
10 INJECTION INTRAMUSCULAR; INTRAVENOUS EVERY 8 HOURS PRN
Status: DISCONTINUED | OUTPATIENT
Start: 2024-02-07 | End: 2024-02-09

## 2024-02-07 RX ORDER — ACETAMINOPHEN 650 MG/1
650 SUPPOSITORY RECTAL EVERY 4 HOURS PRN
Status: DISCONTINUED | OUTPATIENT
Start: 2024-02-07 | End: 2024-02-09

## 2024-02-07 RX ORDER — HEPARIN SODIUM 5000 [USP'U]/ML
5000 INJECTION, SOLUTION INTRAVENOUS; SUBCUTANEOUS EVERY 12 HOURS SCHEDULED
Status: DISCONTINUED | OUTPATIENT
Start: 2024-02-07 | End: 2024-02-08

## 2024-02-07 RX ORDER — LABETALOL HYDROCHLORIDE 5 MG/ML
10 INJECTION, SOLUTION INTRAVENOUS EVERY 10 MIN PRN
Status: DISCONTINUED | OUTPATIENT
Start: 2024-02-07 | End: 2024-02-08

## 2024-02-07 RX ORDER — CLONIDINE HYDROCHLORIDE 0.1 MG/1
0.1 TABLET ORAL AS NEEDED
COMMUNITY
End: 2024-02-09

## 2024-02-07 RX ORDER — LEVOTHYROXINE SODIUM 88 UG/1
88 TABLET ORAL
COMMUNITY

## 2024-02-07 RX ORDER — ASPIRIN 81 MG/1
81 TABLET, CHEWABLE ORAL DAILY
Status: DISCONTINUED | OUTPATIENT
Start: 2024-02-07 | End: 2024-02-09

## 2024-02-07 RX ORDER — ATORVASTATIN CALCIUM 40 MG/1
40 TABLET, FILM COATED ORAL NIGHTLY
Status: DISCONTINUED | OUTPATIENT
Start: 2024-02-07 | End: 2024-02-08

## 2024-02-07 RX ORDER — ACETAMINOPHEN 325 MG/1
650 TABLET ORAL EVERY 4 HOURS PRN
Status: DISCONTINUED | OUTPATIENT
Start: 2024-02-07 | End: 2024-02-09

## 2024-02-07 RX ORDER — SODIUM CHLORIDE 9 MG/ML
INJECTION, SOLUTION INTRAVENOUS CONTINUOUS
Status: DISCONTINUED | OUTPATIENT
Start: 2024-02-07 | End: 2024-02-08

## 2024-02-07 RX ORDER — CLOPIDOGREL BISULFATE 75 MG/1
75 TABLET ORAL DAILY
Status: DISCONTINUED | OUTPATIENT
Start: 2024-02-08 | End: 2024-02-08

## 2024-02-07 NOTE — ED PROVIDER NOTES
Patient Seen in: [facility] Emergency Department      History     Chief Complaint   Patient presents with    Dizziness     Stated Complaint: fall    Subjective:   HPI    89-year-old male with history of hypertension, thyroid disorder, non-Hodgkin's large B cell lymphoma status postchemotherapy, and admission in November, 2023, for pneumonia and PSVT presents with complaints of dizziness and multiple falls.  Patient's family reports that he has had some increased dizziness over the past several weeks and has fallen multiple times most recently this morning.  The patient had an abrasion to his left elbow from an earlier fall which he reopened with today's fall.  He complains of some pain to his elbow.  He is otherwise without complaints of pain at present.  Family believes that he hit his head when he fell today.  He also reports that he has been more forgetful than usual such as asking to go to the bathroom shortly after he went.  No known fevers.  No vomiting or diarrhea.  The patient's son reports that he checks the patient's blood pressure 5-6 times daily and states that has been elevated recently numbers as high as 190/100.  The patient was recently taken off diltiazem by his cardiologist.    Objective:   Past Medical History:   Diagnosis Date    Asthma     Cancer (HCC)     Essential hypertension               No pertinent past surgical history.              No pertinent social history.            Review of Systems    Positive for stated complaint: fall  Other systems are as noted in HPI.  Constitutional and vital signs reviewed.      All other systems reviewed and negative except as noted above.    Physical Exam     ED Triage Vitals [02/07/24 1538]   BP (!) 164/75   Pulse 58   Resp 18   Temp 98.4 °F (36.9 °C)   Temp src Oral   SpO2 92 %   O2 Device None (Room air)       Current:BP (!) 164/75   Pulse 52   Temp 98.4 °F (36.9 °C) (Oral)   Resp 22   Ht 152.4 cm (5')   Wt 50.8 kg   SpO2 97%   BMI  21.87 kg/m²         Physical Exam        General Appearance: alert, no distress  Eyes: pupils equal and round no injection  Respiratory: chest is non tender to palpation, breath sounds are equal  Cardiac: regular rate and rhythm  Gastrointestinal:  soft and non tender, there is no evidence of external or internal trauma by exam.  Neurological: Speech normal.  Cranial nerves II through XII intact.  No focal motor or sensory deficits to extremities x 4.  Skin: No laceration or abrasions.  Musculoskeletal                Head: atraumatic without scalp tenderness                Neck: The cervical spine is tender to palpation to the lower cervical spine                Back: there is no thoracic or lumbar spine or paraspinal tenderness                Extremities are non tender to palpation and there is full range of motion of the joints.    DIFFERENTIAL DIAGNOSIS: after history and physical exam differential diagnosis was considered for trauma post fall including weakness including electrolyte abnormality, depression, anxiety, cardiac etiology, CVA, infectious causes        ED Course     Labs Reviewed   COMP METABOLIC PANEL (14) - Abnormal; Notable for the following components:       Result Value    Glucose 106 (*)     Sodium 131 (*)     Potassium 5.4 (*)     BUN 24 (*)     BUN/CREA Ratio 25.0 (*)     Calcium, Total 8.5 (*)     All other components within normal limits   CBC W/ DIFFERENTIAL - Abnormal; Notable for the following components:    RBC 3.64 (*)     HGB 10.5 (*)     HCT 31.9 (*)     All other components within normal limits   TROPONIN I HIGH SENSITIVITY - Normal   CBC WITH DIFFERENTIAL WITH PLATELET    Narrative:     The following orders were created for panel order CBC With Differential With Platelet.  Procedure                               Abnormality         Status                     ---------                               -----------         ------                     CBC W/ DIFFERENTIAL[[id number]]           Abnormal            Final result                 Please view results for these tests on the individual orders.   URINALYSIS, ROUTINE     EKG    Rate, intervals and axes as noted on EKG Report.  Rate: 57  Rhythm: Sinus Rhythm  Axis: Normal  Reading: Nonspecific EKG                        MDM      Lab, x-ray, EKG, and CT results noted.  Will obtain an MRI to look for CVA as a cause of the patient's dizziness and falls.    MRI results noted.  Patient with acute appearing CVA on MRI.  Will admit to telemetry for further evaluation and treatment.  Discussed with Dr. Swann, internal medicine, who will admit the patient.  Also discussed with Dr. Marshall, neurology.  Admission disposition: 2/7/2024  7:24 PM                                        Medical Decision Making      Disposition and Plan     Clinical Impression:  1. Acute CVA (cerebrovascular accident) (HCC)         Disposition:  Admit  2/7/2024  7:24 pm    Follow-up:  No follow-up provider specified.  We recommend that you schedule follow up care with a primary care provider within the next three months to obtain basic health screening including reassessment of your blood pressure.      Medications Prescribed:  Current Discharge Medication List                            Hospital Problems       Present on Admission  Date Reviewed: 8/22/2023            ICD-10-CM Noted POA    * (Principal) Acute CVA (cerebrovascular accident) (HCC) I63.9 2/7/2024 Unknown

## 2024-02-07 NOTE — ED INITIAL ASSESSMENT (HPI)
Pt presents to the ED with c/o dizziness, elevated blood pressure (systolic 190s) and pt has been more forgetful. Pt is alert and orientated 4 in triage.

## 2024-02-08 ENCOUNTER — APPOINTMENT (OUTPATIENT)
Dept: GENERAL RADIOLOGY | Facility: HOSPITAL | Age: 89
End: 2024-02-08
Attending: INTERNAL MEDICINE
Payer: MEDICARE

## 2024-02-08 LAB
ALBUMIN SERPL-MCNC: 3.8 G/DL (ref 3.2–4.8)
ALBUMIN/GLOB SERPL: 1.3 {RATIO} (ref 1–2)
ALP LIVER SERPL-CCNC: 110 U/L
ALT SERPL-CCNC: 13 U/L
ANION GAP SERPL CALC-SCNC: 6 MMOL/L (ref 0–18)
AST SERPL-CCNC: 18 U/L (ref ?–34)
BASOPHILS # BLD AUTO: 0.04 X10(3) UL (ref 0–0.2)
BASOPHILS NFR BLD AUTO: 0.5 %
BILIRUB SERPL-MCNC: 0.3 MG/DL (ref 0.2–0.9)
BUN BLD-MCNC: 20 MG/DL (ref 9–23)
BUN/CREAT SERPL: 20.4 (ref 10–20)
CALCIUM BLD-MCNC: 8.5 MG/DL (ref 8.7–10.4)
CHLORIDE SERPL-SCNC: 101 MMOL/L (ref 98–112)
CHOLEST SERPL-MCNC: 147 MG/DL (ref ?–200)
CO2 SERPL-SCNC: 25 MMOL/L (ref 21–32)
CREAT BLD-MCNC: 0.98 MG/DL
DEPRECATED RDW RBC AUTO: 42.6 FL (ref 35.1–46.3)
EGFRCR SERPLBLD CKD-EPI 2021: 69 ML/MIN/1.73M2 (ref 60–?)
EOSINOPHIL # BLD AUTO: 0.07 X10(3) UL (ref 0–0.7)
EOSINOPHIL NFR BLD AUTO: 0.8 %
ERYTHROCYTE [DISTWIDTH] IN BLOOD BY AUTOMATED COUNT: 13.5 % (ref 11–15)
EST. AVERAGE GLUCOSE BLD GHB EST-MCNC: 134 MG/DL (ref 68–126)
GLOBULIN PLAS-MCNC: 3 G/DL (ref 2.8–4.4)
GLUCOSE BLD-MCNC: 147 MG/DL (ref 70–99)
GLUCOSE BLDC GLUCOMTR-MCNC: 108 MG/DL (ref 70–99)
GLUCOSE BLDC GLUCOMTR-MCNC: 131 MG/DL (ref 70–99)
GLUCOSE BLDC GLUCOMTR-MCNC: 80 MG/DL (ref 70–99)
GLUCOSE BLDC GLUCOMTR-MCNC: 99 MG/DL (ref 70–99)
HBA1C MFR BLD: 6.3 % (ref ?–5.7)
HCT VFR BLD AUTO: 34.2 %
HDLC SERPL-MCNC: 46 MG/DL (ref 40–59)
HGB BLD-MCNC: 11.4 G/DL
IMM GRANULOCYTES # BLD AUTO: 0.05 X10(3) UL (ref 0–1)
IMM GRANULOCYTES NFR BLD: 0.6 %
LDLC SERPL CALC-MCNC: 86 MG/DL (ref ?–100)
LYMPHOCYTES # BLD AUTO: 3.2 X10(3) UL (ref 1–4)
LYMPHOCYTES NFR BLD AUTO: 36.6 %
MCH RBC QN AUTO: 28.9 PG (ref 26–34)
MCHC RBC AUTO-ENTMCNC: 33.3 G/DL (ref 31–37)
MCV RBC AUTO: 86.8 FL
MONOCYTES # BLD AUTO: 0.92 X10(3) UL (ref 0.1–1)
MONOCYTES NFR BLD AUTO: 10.5 %
NEUTROPHILS # BLD AUTO: 4.46 X10 (3) UL (ref 1.5–7.7)
NEUTROPHILS # BLD AUTO: 4.46 X10(3) UL (ref 1.5–7.7)
NEUTROPHILS NFR BLD AUTO: 51 %
NONHDLC SERPL-MCNC: 101 MG/DL (ref ?–130)
OSMOLALITY SERPL CALC.SUM OF ELEC: 279 MOSM/KG (ref 275–295)
PLATELET # BLD AUTO: 274 10(3)UL (ref 150–450)
POTASSIUM SERPL-SCNC: 4.8 MMOL/L (ref 3.5–5.1)
PROT SERPL-MCNC: 6.8 G/DL (ref 5.7–8.2)
RBC # BLD AUTO: 3.94 X10(6)UL
SODIUM SERPL-SCNC: 132 MMOL/L (ref 136–145)
T4 FREE SERPL-MCNC: 1.1 NG/DL (ref 0.8–1.7)
TRIGL SERPL-MCNC: 77 MG/DL (ref 30–149)
TSI SER-ACNC: 3.22 MIU/ML (ref 0.55–4.78)
VLDLC SERPL CALC-MCNC: 12 MG/DL (ref 0–30)
WBC # BLD AUTO: 8.7 X10(3) UL (ref 4–11)

## 2024-02-08 PROCEDURE — 71045 X-RAY EXAM CHEST 1 VIEW: CPT | Performed by: INTERNAL MEDICINE

## 2024-02-08 PROCEDURE — 99223 1ST HOSP IP/OBS HIGH 75: CPT | Performed by: OTHER

## 2024-02-08 RX ORDER — LEVOTHYROXINE SODIUM 88 UG/1
88 TABLET ORAL
Status: DISCONTINUED | OUTPATIENT
Start: 2024-02-08 | End: 2024-02-09

## 2024-02-08 RX ORDER — FLUTICASONE FUROATE AND VILANTEROL 100; 25 UG/1; UG/1
1 POWDER RESPIRATORY (INHALATION) DAILY
Status: DISCONTINUED | OUTPATIENT
Start: 2024-02-08 | End: 2024-02-09

## 2024-02-08 RX ORDER — MONTELUKAST SODIUM 10 MG/1
10 TABLET ORAL DAILY
Status: DISCONTINUED | OUTPATIENT
Start: 2024-02-08 | End: 2024-02-09

## 2024-02-08 RX ORDER — ALBUTEROL SULFATE 2.5 MG/3ML
2.5 SOLUTION RESPIRATORY (INHALATION) EVERY 6 HOURS PRN
Status: DISCONTINUED | OUTPATIENT
Start: 2024-02-08 | End: 2024-02-09

## 2024-02-08 RX ORDER — ATORVASTATIN CALCIUM 20 MG/1
20 TABLET, FILM COATED ORAL NIGHTLY
Status: DISCONTINUED | OUTPATIENT
Start: 2024-02-08 | End: 2024-02-09

## 2024-02-08 RX ORDER — BENZONATATE 100 MG/1
100 CAPSULE ORAL 3 TIMES DAILY PRN
Status: DISCONTINUED | OUTPATIENT
Start: 2024-02-08 | End: 2024-02-09

## 2024-02-08 RX ORDER — LISINOPRIL 5 MG/1
5 TABLET ORAL 2 TIMES DAILY
Status: DISCONTINUED | OUTPATIENT
Start: 2024-02-09 | End: 2024-02-09

## 2024-02-08 RX ORDER — LISINOPRIL 5 MG/1
5 TABLET ORAL EVERY 6 HOURS PRN
Status: DISCONTINUED | OUTPATIENT
Start: 2024-02-08 | End: 2024-02-09

## 2024-02-08 RX ORDER — TAMSULOSIN HYDROCHLORIDE 0.4 MG/1
0.4 CAPSULE ORAL DAILY
Status: DISCONTINUED | OUTPATIENT
Start: 2024-02-08 | End: 2024-02-09

## 2024-02-08 RX ORDER — CLONIDINE HYDROCHLORIDE 0.1 MG/1
0.1 TABLET ORAL ONCE
Status: COMPLETED | OUTPATIENT
Start: 2024-02-08 | End: 2024-02-08

## 2024-02-08 RX ORDER — ENOXAPARIN SODIUM 100 MG/ML
30 INJECTION SUBCUTANEOUS DAILY
Status: DISCONTINUED | OUTPATIENT
Start: 2024-02-08 | End: 2024-02-09

## 2024-02-08 NOTE — CONSULTS
[facility]  [address] [place] [address], [address]  [facility] [address]  [phone]            Ayla Velez Patient Status:  Inpatient    1923 MRN [id number]   Location [facility] 3W/SW Attending Ayla Swann MD   Hosp Day # 1 PCP AYLA SWANN MD, MD     Date of Admission:  2024  Date of Consult:  2024  Reason for Consultation:   Dizzy spells, presyncopal events.    History of Present Illness:   Patient is a 89 year old male who was admitted to the hospital for Acute CVA (cerebrovascular accident) (HCC):  Patient has somewhat hard time describing exactly his symptoms.  History of poorly controlled hypertension for the last few weeks.  Tremors are getting worse in terms of his dizzy spells, lightheaded spells.  He describes both shifting sensation and sensation of lightheadedness when she stands up quickly and needs to sit down again.  Also painful knees, poor balance.  No urinary incontinence.  During workup in emergency room in 2024 small punctate diffusion lesion in the right frontal lobe was noted.    Also losing hearing, especially on the left side.  History of chemotherapy for his lymphoma.  Still has a port placed.    Past Medical History  Past Medical History:   Diagnosis Date    Asthma     Cancer (HCC)     Essential hypertension        Past Surgical History  No past surgical history on file.    Family History  No family history on file.    Social History  Pediatric History   Patient Parents    Not on file     Other Topics Concern    Not on file   Social History Narrative    Not on file           Current Medications:  Current Facility-Administered Medications   Medication Dose Route Frequency    enoxaparin (Lovenox) 30 MG/0.3ML SUBQ injection 30 mg  30 mg Subcutaneous Daily    albuterol (Ventolin) (2.5 MG/3ML) 0.083% nebulizer solution 2.5 mg  2.5 mg Nebulization Q6H PRN    levothyroxine (Synthroid) tab 88 mcg  88 mcg Oral Before  breakfast    metoprolol tartrate (Lopressor) tab 25 mg  25 mg Oral BID    montelukast (Singulair) tab 10 mg  10 mg Oral Daily    fluticasone furoate-vilanterol (Breo Ellipta) 100-25 MCG/ACT inhaler 1 puff  1 puff Inhalation Daily    tamsulosin (Flomax) cap 0.4 mg  0.4 mg Oral Daily    pantoprazole (Protonix) 40 mg in sodium chloride 0.9% PF 10 mL IV push  40 mg Intravenous Daily    atorvastatin (Lipitor) tab 20 mg  20 mg Oral Nightly    sodium chloride 0.9% infusion   Intravenous Continuous    acetaminophen (Tylenol) tab 650 mg  650 mg Oral Q4H PRN    Or    acetaminophen (Tylenol) rectal suppository 650 mg  650 mg Rectal Q4H PRN    labetalol (Trandate) 5 mg/mL injection 10 mg  10 mg Intravenous Q10 Min PRN    hydrALAzine (Apresoline) 20 mg/mL injection 10 mg  10 mg Intravenous Q2H PRN    ondansetron (Zofran) 4 MG/2ML injection 4 mg  4 mg Intravenous Q6H PRN    metoclopramide (Reglan) 5 mg/mL injection 10 mg  10 mg Intravenous Q8H PRN    aspirin chewable tab 81 mg  81 mg Oral Daily     Medications Prior to Admission   Medication Sig    cloNIDine 0.1 MG Oral Tab Take 1 tablet (0.1 mg total) by mouth as needed.    metoprolol tartrate 25 MG Oral Tab Take 1 tablet (25 mg total) by mouth 2 (two) times daily.    levothyroxine 88 MCG Oral Tab Take 1 tablet (88 mcg total) by mouth before breakfast.    albuterol (2.5 MG/3ML) 0.083% Inhalation Nebu Soln Take by nebulization every 6 (six) hours as needed for Wheezing.    SYMBICORT 160-4.5 MCG/ACT Inhalation Aerosol Inhale 2 puffs into the lungs in the morning and 2 puffs before bedtime.    albuterol 108 (90 Base) MCG/ACT Inhalation Aero Soln Inhale 1 puff into the lungs every 6 (six) hours as needed for Wheezing or Shortness of Breath.    montelukast 10 MG Oral Tab Take 1 tablet (10 mg total) by mouth daily.    tamsulosin 0.4 MG Oral Cap Take 1 capsule (0.4 mg total) by mouth daily.    dilTIAZem HCl ER Beads 120 MG Oral Capsule SR 24 Hr Take 1 capsule (120 mg total) by mouth  daily. (Patient not taking: Reported on 2024)    [] predniSONE 20 MG Oral Tab Take 2 tablets (40 mg total) by mouth daily with breakfast for 3 days.    [] amoxicillin clavulanate 875-125 MG Oral Tab Take 1 tablet by mouth 2 (two) times daily for 2 days.    levothyroxine 75 MCG Oral Tab Take 1 tablet (75 mcg total) by mouth daily.       Allergies  No Known Allergies    Review of Systems:   As in HPI, the rest of the 14 system review was done and was negative    Physical Exam:     Vitals:    24 0158 24 0404 24 0610 24 0800   BP: 140/56 (!) 163/76 (!) 191/87 (!) 164/69   Pulse:   75 84   Resp: 16 18 18 20   Temp: 98.1 °F (36.7 °C) 97.7 °F (36.5 °C) 98 °F (36.7 °C) 98.3 °F (36.8 °C)   TempSrc: Axillary Axillary Axillary Axillary   SpO2: 96% 96% 95% 92%   Weight:  110 lb 14.4 oz (50.3 kg)     Height:           General: No apparent distress, well nourished, well groomed.  Head- Normocephalic, atraumatic  Eyes- No redness or swelling  ENT- Hearing intake, smell preserved, normal glutition  Neck- No masses or adenopathy  Cv: pulses were palpable and normal, no cyanosis or edema     Neurological:     Mental Status- Alert difficult to assess fully, possible history of cognitive changes.    Cranial Nerves:  II.- Visual fields full to confrontation    III, IV, VI- EOM intact, MICHELE  V. Facial sensation intact  VII. Face symmetric, no facial weakness  VIII. Hearing intact.  IX. Pallet elevates symmetrically.  XI. Shoulder shrug is intact  XII. Tongue is midline    Motor Exam:  Muscle tone normal  No atrophy or fasciculations  Strength- upper extremities 5/5 proximally and distally                  - lower  extremities 5-/5 proximally and distally    Sensory Exam:  Light touch sensation- intact in all 4 extremities  Sensation might have been slightly decreased in the feet themselves.    Deep Tendon Reflexes:  Biceps 2+ bilateral symmetric  Triceps 2+ bilateral symmetric  Brachioradialis 2  + bilateral symmetric  Patellar 1+ bilateral symmetric  Ankle jerk absent bilateral     No clonus  No Babinski sign    Coordination:  Finger to nose intact  Rapid alternating movements intact      Results:     Laboratory Data:  Lab Results   Component Value Date    WBC 6.7 02/07/2024    HGB 10.5 (L) 02/07/2024    HCT 31.9 (L) 02/07/2024    .0 02/07/2024    CREATSERUM 0.96 02/07/2024    BUN 24 (H) 02/07/2024     (L) 02/07/2024    K 5.4 (H) 02/07/2024     02/07/2024    CO2 27.0 02/07/2024     (H) 02/07/2024    CA 8.5 (L) 02/07/2024    ALB 3.6 02/07/2024    ALKPHO 102 02/07/2024    TP 6.4 02/07/2024    AST 21 02/07/2024    ALT 12 02/07/2024    .0 (H) 12/09/2023    INR 1.00 12/09/2023    PTP 13.8 12/09/2023    T4F 1.1 02/08/2024    TSH 3.219 02/08/2024    MG 1.8 11/20/2023         Imaging:    CTA BRAIN + CTA CAROTIDS (CPT=70496/77278)    Result Date: 2/8/2024  CONCLUSION: No large artery occlusion  [facility] provided a prelim report for this exam. This final report has no significant discrepancies with the [facility] report.      Dictated by (CST): Vernon Vu MD on 2/08/2024 at 7:32 AM     Finalized by (CST): Vernon Vu MD on 2/08/2024 at 7:39 AM          MRI BRAIN WO ACUTE (3) SEQUENCE (CPT=70551)    Result Date: 2/7/2024  CONCLUSION:  1. Acute right frontal, periinsular lacunar infarct related to small vessel disease. 2. Moderate changes of chronic small vessel disease in cerebral white matter. 3. Pathologic signal in the right temporal lobe probably represents an old subcortical infarct/insult. 4. Changes of chronic small vessel disease in fransico.   Dictated by (CST): Wilton Ralph MD on 2/07/2024 at 6:37 PM     Finalized by (CST): Wilton Ralph MD on 2/07/2024 at 6:44 PM          CT SPINE CERVICAL (CPT=72125)    Result Date: 2/7/2024  CONCLUSION:  1. Acute-appearing mild superior endplate compression fracture deformity of T1. There is a suspected additional fracture of T3.   2. Multilevel degenerative changes of the cervical spine are perceived.  3. Lesser incidental findings as above.    Dictated by (CST): Yaya Gan MD on 2/07/2024 at 5:48 PM     Finalized by (CST): Yaya Gan MD on 2/07/2024 at 5:51 PM          CT BRAIN OR HEAD (55941)    Result Date: 2/7/2024  CONCLUSION:  1. Negative for depressed calvarial fracture, coup/contrecoup intraparenchymal contusion, intracranial hemorrhage, or further evidence of acute intracranial process by noncontrast CT technique. If there is clinical concern for acute ischemia, follow-up MRI would have greater sensitivity.  2. Senescent changes of parenchymal volume loss with sequela of chronic microvascular ischemic disease, including chronic lacunar infarcts of the basal ganglia.  3. There is also large vessel atherosclerosis involving the anterior and posterior circulations.  4. Lesser incidental findings as above.      Dictated by (CST): Yaya Gan MD on 2/07/2024 at 5:42 PM     Finalized by (CST): Yaya Gan MD on 2/07/2024 at 5:45 PM         EKG    Result Date: 2/7/2024  Sinus bradycardia with 1st degree A-V block Possible Left atrial enlargement Right bundle branch block Septal infarct (cited on or before 16-NOV-2023) Abnormal ECG When compared with ECG of 09-DEC-2023 08:24, Premature atrial complexes are no longer Present Vent. rate has decreased BY  29 BPM Confirmed by CRISTOPHER MOORE, QUINCY ([id number]) on 2/7/2024 5:18:59 PM     MRI of the brain was independent reviewed, small acute stroke was noted.    Impression:     Acute CVA (cerebrovascular accident) (HCC)    Patient with most likely multifactorial reasons for his balance and dizzy spells problems.  Possibility of some vestibular, considering that he did does describe movement of the head triggering rigidity spells.    Possibility of some mild sensory neuropathy contributing to the sensory ataxia  Significant pain in her knees.  Poor control of hypertension.  Possible  component of orthostatic hypotension    -patient was not a thrombolysis candidate because outisde of window   -patient did not receive thrombectomy because there was no LVO     -Continue Aspirin 81 mg daily  -Continue Atorvastatin 20 mg daily   -MRI brain above  -CTA head and neck significant stenosis or occlusion.  -Echocardiogram    -PT, OT and speech therapy     -Suggest 30-day event monitor on outpatient basis as well    Possibly will require rehabilitation but apparently patient is very reluctant to consider that.    Possibility of cervical involvement was also entertained, but there was no evidence of urinary incontinence.  There was no obvious hyperreflexia in lower extremities either.    STROKE RISK FACTORS:   *Hypertension - Target blood pressure <140/90, <130/85 for high risk, normal 120/80  *Physical inactivity - target exercise at least 3 times per week  *Obesity - target ideal body weight and girth <35\" for women, <40\" for men  *Diabetes - Target <6.5-7%   *Smoking - Target smoking cessation  *Hyperlipidemia - Target total cholesterol < 200, Target LDL <100, < 70 for high risk, Target HDL >45 for men, >55 for women, Target triglycerides <150    Patient could be discharged from neurology stand point.  I spoke with his son as well as granddaughter-in-law who is a neurologist herself.    Thank you for allowing me to participate in the care of your patient.    Follow-up in the clinic in 1 month.    Nimesh Marshall MD  2/8/2024

## 2024-02-08 NOTE — PLAN OF CARE
Problem: Patient Centered Care  Goal: Patient preferences are identified and integrated in the patient's plan of care  Description: Interventions:  - What would you like us to know as we care for you? From home with son   Problem: CARDIOVASCULAR - ADULT  Goal: Maintains optimal cardiac output and hemodynamic stability  Description: INTERVENTIONS:  - Monitor vital signs, rhythm, and trends  - Monitor for bleeding, hypotension and signs of decreased cardiac output  - Evaluate effectiveness of vasoactive medications to optimize hemodynamic stability  - Monitor arterial and/or venous puncture sites for bleeding and/or hematoma  - Assess quality of pulses, skin color and temperature  - Assess for signs of decreased coronary artery perfusion - ex. Angina  - Evaluate fluid balance, assess for edema, trend weights  Outcome: Progressing  Goal: Absence of cardiac arrhythmias or at baseline  Description: INTERVENTIONS:  - Continuous cardiac monitoring, monitor vital signs, obtain 12 lead EKG if indicated  - Evaluate effectiveness of antiarrhythmic and heart rate control medications as ordered  - Initiate emergency measures for life threatening arrhythmias  - Monitor electrolytes and administer replacement therapy as ordered  Outcome: Progressing     Problem: NEUROLOGICAL - ADULT  Goal: Achieves stable or improved neurological status  Description: INTERVENTIONS  - Assess for and report changes in neurological status  - Initiate measures to prevent increased intracranial pressure  - Maintain blood pressure and fluid volume within ordered parameters to optimize cerebral perfusion and minimize risk of hemorrhage  - Monitor temperature, glucose, and sodium. Initiate appropriate interventions as ordered  Outcome: Progressing  Goal: Absence of seizures  Description: INTERVENTIONS  - Monitor for seizure activity  - Administer anti-seizure medications as ordered  - Monitor neurological status  Outcome: Progressing  Goal: Remains free  of injury related to seizure activity  Description: INTERVENTIONS:  - Maintain airway, patient safety  and administer oxygen as ordered  - Monitor patient for seizure activity, document and report duration and description of seizure to MD/LIP  - If seizure occurs, turn patient to side and suction secretions as needed  - Reorient patient post seizure  - Seizure pads on all 4 side rails  - Instruct patient/family to notify RN of any seizure activity  - Instruct patient/family to call for assistance with activity based on assessment  Outcome: Progressing  Goal: Achieves maximal functionality and self care  Description: INTERVENTIONS  - Monitor swallowing and airway patency with patient fatigue and changes in neurological status  - Encourage and assist patient to increase activity and self care with guidance from PT/OT  - Encourage visually impaired, hearing impaired and aphasic patients to use assistive/communication devices  Outcome: Progressing     Problem: METABOLIC/FLUID AND ELECTROLYTES - ADULT  Goal: Glucose maintained within prescribed range  Description: INTERVENTIONS:  - Monitor Blood Glucose as ordered  - Assess for signs and symptoms of hyperglycemia and hypoglycemia  - Administer ordered medications to maintain glucose within target range  - Assess barriers to adequate nutritional intake and initiate nutrition consult as needed  - Instruct patient on self management of diabetes  Outcome: Progressing  Goal: Electrolytes maintained within normal limits  Description: INTERVENTIONS:  - Monitor labs and rhythm and assess patient for signs and symptoms of electrolyte imbalances  - Administer electrolyte replacement as ordered  - Monitor response to electrolyte replacements, including rhythm and repeat lab results as appropriate  - Fluid restriction as ordered  - Instruct patient on fluid and nutrition restrictions as appropriate  Outcome: Progressing     - Provide timely, complete, and accurate information to  patient/family  - Incorporate patient and family knowledge, values, beliefs, and cultural backgrounds into the planning and delivery of care  - Encourage patient/family to participate in care and decision-making at the level they choose  - Honor patient and family perspectives and choices  Outcome: Progressing     Problem: NEUROLOGICAL - ADULT  Goal: Achieves stable or improved neurological status  Description: INTERVENTIONS  - Assess for and report changes in neurological status  - Initiate measures to prevent increased intracranial pressure  - Maintain blood pressure and fluid volume within ordered parameters to optimize cerebral perfusion and minimize risk of hemorrhage  - Monitor temperature, glucose, and sodium. Initiate appropriate interventions as ordered  Outcome: Progressing  Goal: Absence of seizures  Description: INTERVENTIONS  - Monitor for seizure activity  - Administer anti-seizure medications as ordered  - Monitor neurological status  Outcome: Progressing  Goal: Remains free of injury related to seizure activity  Description: INTERVENTIONS:  - Maintain airway, patient safety  and administer oxygen as ordered  - Monitor patient for seizure activity, document and report duration and description of seizure to MD/LIP  - If seizure occurs, turn patient to side and suction secretions as needed  - Reorient patient post seizure  - Seizure pads on all 4 side rails  - Instruct patient/family to notify RN of any seizure activity  - Instruct patient/family to call for assistance with activity based on assessment  Outcome: Progressing  Goal: Achieves maximal functionality and self care  Description: INTERVENTIONS  - Monitor swallowing and airway patency with patient fatigue and changes in neurological status  - Encourage and assist patient to increase activity and self care with guidance from PT/OT  - Encourage visually impaired, hearing impaired and aphasic patients to use assistive/communication  devices  Outcome: Progressing     Problem: Impaired Functional Mobility  Goal: Achieve highest/safest level of mobility/gait  Description: Interventions:  - Assess patient's functional ability and stability  - Promote increasing activity/tolerance for mobility and gait  - Educate and engage patient/family in tolerated activity level and precautions  - Recommend use of chair position in bed 3 times per day  Outcome: Progressing     Problem: Impaired Swallowing  Goal: Minimize aspiration risk  Description: Interventions:  - Patient should be alert and upright for all feedings (90 degrees preferred)  - Offer food and liquids at a slow rate  - No straws  - Encourage small bites of food and small sips of liquid  - Offer pills one at a time, crush or deliver with applesauce as needed  - Discontinue feeding and notify MD (or speech pathologist) if coughing or persistent throat clearing or wet/gurgly vocal quality is noted  Outcome: Progressing     Problem: RISK FOR INFECTION - ADULT  Goal: Absence of fever/infection during anticipated neutropenic period  Description: INTERVENTIONS  - Monitor WBC  - Administer growth factors as ordered  - Implement neutropenic guidelines  Outcome: Progressing     Problem: SAFETY ADULT - FALL  Goal: Free from fall injury  Description: INTERVENTIONS:  - Assess pt frequently for physical needs  - Identify cognitive and physical deficits and behaviors that affect risk of falls.  - [place] fall precautions as indicated by assessment.  - Educate pt/family on patient safety including physical limitations  - Instruct pt to call for assistance with activity based on assessment  - Modify environment to reduce risk of injury  - Provide assistive devices as appropriate  - Consider OT/PT consult to assist with strengthening/mobility  - Encourage toileting schedule  Outcome: Progressing     Problem: DISCHARGE PLANNING  Goal: Discharge to home or other facility with appropriate resources  Description:  INTERVENTIONS:  - Identify barriers to discharge w/pt and caregiver  - Include patient/family/discharge partner in discharge planning  - Arrange for needed discharge resources and transportation as appropriate  - Identify discharge learning needs (meds, wound care, etc)  - Arrange for interpreters to assist at discharge as needed  - Consider post-discharge preferences of patient/family/discharge partner  - Complete POLST form as appropriate  - Assess patient's ability to be responsible for managing their own health  - Refer to Case Management Department for coordinating discharge planning if the patient needs post-hospital services based on physician/LIP order or complex needs related to functional status, cognitive ability or social support system  Outcome: Progressing

## 2024-02-08 NOTE — PROGRESS NOTES
[facility]    Progress Note    Ayla Velez Patient Status:  Inpatient    1923 MRN [id number]   Location [facility] 3W/SW Attending Ayla Swann MD   Hosp Day # 1 PCP AYLA SWANN MD, MD     Chief Complaint: frequent falls and left sided weakness.    Subjective:     Constitutional:  Negative for chills, fatigue and fever.        Frequent falls.  Left sided weakness.   HENT:  Negative for sore throat.    Eyes:  Negative for pain.   Respiratory:  Negative for chest tightness and wheezing.    Cardiovascular:  Negative for chest pain.   Genitourinary:  Negative for difficulty urinating.   Musculoskeletal:  Negative for back pain.   Neurological:  Negative for seizures and headaches.   Psychiatric/Behavioral:  Negative for agitation.        Objective:   Blood pressure (!) 191/87, pulse 75, temperature 98 °F (36.7 °C), temperature source Axillary, resp. rate 18, height 5' (1.524 m), weight 110 lb 14.4 oz (50.3 kg), SpO2 95%.  Physical Exam  Vitals reviewed. Exam conducted with a chaperone present.   HENT:      Head: Normocephalic.      Mouth/Throat:      Mouth: Mucous membranes are moist.   Eyes:      Extraocular Movements: Extraocular movements intact.   Cardiovascular:      Rate and Rhythm: Regular rhythm.      Pulses: Normal pulses.   Pulmonary:      Breath sounds: Normal breath sounds.   Abdominal:      Palpations: Abdomen is soft.   Musculoskeletal:      Cervical back: Neck supple.      Comments: Left sided mild weakness   Skin:     General: Skin is warm.   Neurological:      Mental Status: He is alert.   Psychiatric:         Behavior: Behavior normal.         Results:   Lab Results   Component Value Date    WBC 6.7 2024    HGB 10.5 (L) 2024    HCT 31.9 (L) 2024    .0 2024    CREATSERUM 0.96 2024    BUN 24 (H) 2024     (L) 2024    K 5.4 (H) 2024     2024    CO2 27.0 2024     (H)  02/07/2024    CA 8.5 (L) 02/07/2024    ALB 3.6 02/07/2024    ALKPHO 102 02/07/2024    BILT 0.2 02/07/2024    TP 6.4 02/07/2024    AST 21 02/07/2024    ALT 12 02/07/2024    .0 (H) 12/09/2023    INR 1.00 12/09/2023    TSH 1.472 11/19/2023    MG 1.8 11/20/2023    TROPHS 8 02/07/2024       MRI BRAIN WO ACUTE (3) SEQUENCE (CPT=70551)    Result Date: 2/7/2024  CONCLUSION:  1. Acute right frontal, periinsular lacunar infarct related to small vessel disease. 2. Moderate changes of chronic small vessel disease in cerebral white matter. 3. Pathologic signal in the right temporal lobe probably represents an old subcortical infarct/insult. 4. Changes of chronic small vessel disease in fransico.   Dictated by (CST): Wilton Ralph MD on 2/07/2024 at 6:37 PM     Finalized by (CST): Wilton Ralph MD on 2/07/2024 at 6:44 PM          CT SPINE CERVICAL (CPT=72125)    Result Date: 2/7/2024  CONCLUSION:  1. Acute-appearing mild superior endplate compression fracture deformity of T1. There is a suspected additional fracture of T3.  2. Multilevel degenerative changes of the cervical spine are perceived.  3. Lesser incidental findings as above.    Dictated by (CST): Yaya Gan MD on 2/07/2024 at 5:48 PM     Finalized by (CST): Yaya Gan MD on 2/07/2024 at 5:51 PM          CT BRAIN OR HEAD (02841)    Result Date: 2/7/2024  CONCLUSION:  1. Negative for depressed calvarial fracture, coup/contrecoup intraparenchymal contusion, intracranial hemorrhage, or further evidence of acute intracranial process by noncontrast CT technique. If there is clinical concern for acute ischemia, follow-up MRI would have greater sensitivity.  2. Senescent changes of parenchymal volume loss with sequela of chronic microvascular ischemic disease, including chronic lacunar infarcts of the basal ganglia.  3. There is also large vessel atherosclerosis involving the anterior and posterior circulations.  4. Lesser incidental findings as above.       Dictated by (CST): Yaya Gan MD on 2/07/2024 at 5:42 PM     Finalized by (CST): Yaya Gan MD on 2/07/2024 at 5:45 PM         EKG    Result Date: 2/7/2024  Sinus bradycardia with 1st degree A-V block Possible Left atrial enlargement Right bundle branch block Septal infarct (cited on or before 16-NOV-2023) Abnormal ECG When compared with ECG of 09-DEC-2023 08:24, Premature atrial complexes are no longer Present Vent. rate has decreased BY  29 BPM Confirmed by CRISTOPHER MOORE, AMTHEW ([id number]) on 2/7/2024 5:18:59 PM     Assessment & Plan:     Acute CVA (cerebrovascular accident) (HCC)  Right frontal stroke.  Hyperkalemia.  Htn.  Anemia.  Thoracic compression fracture.  Hypothyroidism.    P  Tele.  Npo, Swallow test at bedside.  Iv fluids.  Neuro consult.  Lovenox subcutaneous.  Labs in am.  Med rec done.  Protonix iv  Tsh, free t4.        CAROLYN OROURKE MD, MD  2/8/2024

## 2024-02-08 NOTE — PROGRESS NOTES
[facility]    Progress Note    Ayla Velez Patient Status:  Inpatient    1923 MRN [id number]   Location [facility] 3W/SW Attending Ayla Swann MD   Hosp Day # 1 PCP AYLA SWANN MD, MD     Chief Complaint: frequent falls and left sided weakness.    Subjective:     Constitutional:  Negative for chills, fatigue and fever.        Frequent falls.  Left sided weakness.   HENT:  Negative for sore throat.    Eyes:  Negative for pain.   Respiratory:  Negative for chest tightness and wheezing.    Cardiovascular:  Negative for chest pain.   Genitourinary:  Negative for difficulty urinating.   Musculoskeletal:  Negative for back pain.   Neurological:  Negative for seizures and headaches.   Psychiatric/Behavioral:  Negative for agitation.        Objective:   Blood pressure 100/53, pulse 65, temperature 98.2 °F (36.8 °C), temperature source Axillary, resp. rate 18, height 5' (1.524 m), weight 110 lb 14.4 oz (50.3 kg), SpO2 99%.  Physical Exam  Vitals reviewed. Exam conducted with a chaperone present.   HENT:      Head: Normocephalic.      Mouth/Throat:      Mouth: Mucous membranes are moist.   Eyes:      Extraocular Movements: Extraocular movements intact.   Cardiovascular:      Rate and Rhythm: Regular rhythm.      Pulses: Normal pulses.   Pulmonary:      Breath sounds: Normal breath sounds.   Abdominal:      Palpations: Abdomen is soft.   Musculoskeletal:      Cervical back: Neck supple.      Comments: Left sided mild weakness   Skin:     General: Skin is warm.   Neurological:      Mental Status: He is alert.   Psychiatric:         Behavior: Behavior normal.         Results:   Lab Results   Component Value Date    WBC 8.7 2024    HGB 11.4 (L) 2024    HCT 34.2 (L) 2024    .0 2024    CREATSERUM 0.98 2024    BUN 20 2024     (L) 2024    K 4.8 2024     2024    CO2 25.0 2024     (H) 2024    CA  8.5 (L) 02/08/2024    ALB 3.8 02/08/2024    ALKPHO 110 02/08/2024    BILT 0.3 02/08/2024    TP 6.8 02/08/2024    AST 18 02/08/2024    ALT 13 02/08/2024    .0 (H) 12/09/2023    INR 1.00 12/09/2023    T4F 1.1 02/08/2024    TSH 3.219 02/08/2024    MG 1.8 11/20/2023    TROPHS 8 02/07/2024       CTA BRAIN + CTA CAROTIDS (CPT=70496/19571)    Result Date: 2/8/2024  CONCLUSION: No large artery occlusion  [facility] provided a prelim report for this exam. This final report has no significant discrepancies with the [facility] report.      Dictated by (CST): Vernon Vu MD on 2/08/2024 at 7:32 AM     Finalized by (CST): Vernon Vu MD on 2/08/2024 at 7:39 AM          MRI BRAIN WO ACUTE (3) SEQUENCE (CPT=70551)    Result Date: 2/7/2024  CONCLUSION:  1. Acute right frontal, periinsular lacunar infarct related to small vessel disease. 2. Moderate changes of chronic small vessel disease in cerebral white matter. 3. Pathologic signal in the right temporal lobe probably represents an old subcortical infarct/insult. 4. Changes of chronic small vessel disease in fransico.   Dictated by (CST): Wilton Ralph MD on 2/07/2024 at 6:37 PM     Finalized by (CST): Wilton Ralph MD on 2/07/2024 at 6:44 PM          CT SPINE CERVICAL (CPT=72125)    Result Date: 2/7/2024  CONCLUSION:  1. Acute-appearing mild superior endplate compression fracture deformity of T1. There is a suspected additional fracture of T3.  2. Multilevel degenerative changes of the cervical spine are perceived.  3. Lesser incidental findings as above.    Dictated by (CST): Yaya Gan MD on 2/07/2024 at 5:48 PM     Finalized by (CST): Yaya Gan MD on 2/07/2024 at 5:51 PM          CT BRAIN OR HEAD ([id number])    Result Date: 2/7/2024  CONCLUSION:  1. Negative for depressed calvarial fracture, coup/contrecoup intraparenchymal contusion, intracranial hemorrhage, or further evidence of acute intracranial process by noncontrast CT technique. If there is clinical  concern for acute ischemia, follow-up MRI would have greater sensitivity.  2. Senescent changes of parenchymal volume loss with sequela of chronic microvascular ischemic disease, including chronic lacunar infarcts of the basal ganglia.  3. There is also large vessel atherosclerosis involving the anterior and posterior circulations.  4. Lesser incidental findings as above.      Dictated by (CST): Yaya Gan MD on 2/07/2024 at 5:42 PM     Finalized by (CST): Yaya Gan MD on 2/07/2024 at 5:45 PM         EKG    Result Date: 2/7/2024  Sinus bradycardia with 1st degree A-V block Possible Left atrial enlargement Right bundle branch block Septal infarct (cited on or before 16-NOV-2023) Abnormal ECG When compared with ECG of 09-DEC-2023 08:24, Premature atrial complexes are no longer Present Vent. rate has decreased BY  29 BPM Confirmed by CRISTOPHER MOORE, MATHEW (48) on 2/7/2024 5:18:59 PM     Assessment & Plan:     Acute CVA (cerebrovascular accident) (HCC)  Right frontal stroke.  Hyperkalemia.  Htn.  Anemia.  Thoracic compression fracture.  Hypothyroidism.    P  Pt wants to go home today.  Bp still high.  Start lisinopril po tomorrow          CAROLYN OROURKE MD, MD  2/8/2024

## 2024-02-08 NOTE — PHYSICAL THERAPY NOTE
PHYSICAL THERAPY EVALUATION - INPATIENT     Room Number: 339/339-A  Evaluation Date: 2/8/2024  Type of Evaluation: Initial   Physician Order: PT Eval and Treat    Presenting Problem: falls, dizziness, right frontal infarct  Co-Morbidities : lymphoma, asthma  Reason for Therapy: Mobility Dysfunction and Discharge Planning    PHYSICAL THERAPY ASSESSMENT   Patient is a 89 year old male admitted 2/7/2024 for falls, dizziness, MRI reveals right frontal infarct.  Prior to admission, patient's baseline: pt lives with his son who is present for PT evaluation, pt requesting his son translate as his primary language is English. Per pt's son, pt ambulates with rw, has assist as needed for adls, and reports he provides 24 hour supervision along with the assist of other family members.   Patient is currently functioning near baseline with transfers and gait.  Patient is requiring minimal assist as a result of the following impairments: decreased functional strength, decreased endurance/aerobic capacity, impaired standing balance, and medical status.  Physical Therapy will continue to follow for duration of hospitalization.    Patient will benefit from continued skilled PT Services For duration of hospitalization, however, given the patient is functioning near baseline level do not anticipate skilled therapy needs at discharge .    PLAN  PT Treatment Plan: Endurance;Energy conservation;Patient education;Gait training  Rehab Potential : Good  Frequency (Obs): 3x/week    PHYSICAL THERAPY MEDICAL/SOCIAL HISTORY        Problem List  Principal Problem:    Acute CVA (cerebrovascular accident) (HCC)      HOME SITUATION  Home Situation  Type of Home: House  Home Layout: Able to live on main level (pt's son reports pt does not have to ambulate on stairs)  Lives With: Son  Drives: No  Patient Owned Equipment: Rolling walker     Prior Level of [place]: pt lives with his son who is present for PT evaluation, pt requesting his son  translate as his primary language is Faroese. Per pt's son, pt ambulates with rw, has assist as needed for adls, and reports he provides 24 hour supervision along with the assist of other family members.       PHYSICAL THERAPY EXAMINATION   OBJECTIVE  Precautions: Bed/chair alarm  Fall Risk: High fall risk    WEIGHT BEARING RESTRICTION  Weight Bearing Restriction: None                PAIN ASSESSMENT  Rating: Unable to rate  Location: bilat knees  Management Techniques: Activity promotion    COGNITION  Following Commands:  follows one step commands consistently  Safety Judgement:  decreased awareness of need for assistance and decreased awareness of need for safety  Awareness of Errors:  assistance required to identify errors made and assistance required to correct errors made    RANGE OF MOTION AND STRENGTH ASSESSMENT  Upper extremity ROM and strength are within functional limits   Lower extremity ROM is within functional limits   Lower extremity strength is within functional limits grossly 3/5, pt with chronic pain bilat knees    BALANCE  Static Sitting: Good  Dynamic Sitting: Good  Static Standing: Poor +  Dynamic Standing: Poor +      NEUROLOGICAL FINDINGS--none                      ACTIVITY TOLERANCE  Pulse: 71  Heart Rate Source: Monitor     BP: (!) 188/88 discussed with rn  BP Location: Right arm  BP Method: Automatic  Patient Position: Sitting    O2 WALK  Oxygen Therapy  SPO2% on Room Air at Rest: 98    AM-PAC '6-Clicks' INPATIENT SHORT FORM - BASIC MOBILITY  How much difficulty does the patient currently have...  Patient Difficulty: Turning over in bed (including adjusting bedclothes, sheets and blankets)?: A Little   Patient Difficulty: Sitting down on and standing up from a chair with arms (e.g., wheelchair, bedside commode, etc.): A Little   Patient Difficulty: Moving from lying on back to sitting on the side of the bed?: A Little   How much help from another person does the patient currently need...   Help  from Another: Moving to and from a bed to a chair (including a wheelchair)?: A Little   Help from Another: Need to walk in hospital room?: A Little   Help from Another: Climbing 3-5 steps with a railing?: A Little     AM-PAC Score:  Raw Score: 18   Approx Degree of Impairment: 46.58%   Standardized Score (AM-PAC Scale): 43.63   CMS Modifier (G-Code): CK    FUNCTIONAL ABILITY STATUS  Functional Mobility/Gait Assessment  Gait Assistance: Minimum assistance  Distance (ft): 40  Assistive Device: Rolling walker  Pattern: Shuffle  Rolling: independent  Supine to Sit: supervision  Sit to Stand: minimal assist to rw from higher bed    Exercise/Education Provided:  Energy conservation  Functional activity tolerated  Gait training  Posture    Skilled Therapy Provided: Pt ok to see per rn, pt recd in supine, his son present, educated in role of PT, goals for session, importance of consistent mobility.  Pt requesting his son translate as his primary language is Maltese.   Pt is alert and orientedx2, able to follow commands.   Pt tolerated ambulation with rw well, cueing safe rw use, upright posture. Pt up in bedside chair end of session, son remained in room with pt.  Discussed session and BP with rn.     The patient's Approx Degree of Impairment: 46.58% has been calculated based on documentation in the [facility] '6 clicks' Inpatient Basic Mobility Short Form.  Research supports that patients with this level of impairment may benefit from home with supportive family.  Final disposition will be made by interdisciplinary medical team.    Patient End of Session: Up in chair;Needs met;Call light within reach;RN aware of session/findings;All patient questions and concerns addressed;Family present    CURRENT GOALS  Goals to be met by: 2/14/24  Patient Goal Patient's self-stated goal is: did not state   Goal #1 Patient is able to demonstrate supine - sit EOB @ level: supervision     Goal #1   Current Status    Goal #2 Patient is able to  demonstrate transfers Sit to/from Stand at assistance level: supervision with walker - rolling     Goal #2  Current Status    Goal #3 Patient is able to ambulate 100 feet with assist device: walker - rolling at assistance level: supervision   Goal #3   Current Status    Goal #4    Goal #4   Current Status    Goal #5 Patient to demonstrate independence with home activity/exercise instructions provided to patient in preparation for discharge.   Goal #5   Current Status    Goal #6    Goal #6  Current Status      Patient Evaluation Complexity Level:  History Moderate - 1 or 2 personal factors and/or co-morbidities   Examination of body systems Moderate - addressing a total of 3 or more elements   Clinical Presentation  Moderate - Evolving   Clinical Decision Making  Moderate Complexity       Therapeutic Activity:  15 minutes

## 2024-02-08 NOTE — ED QUICK NOTES
Chief Complaint   Patient presents with    Dizziness     Patient to ed via private vehicle with family.    Per patient's son, patient co of dizziness x few days with elevated bp at home (sbp 190s). Patient currently aox4. Neuro intact. Dizziness worsening with movement.    Patient changed into gown, on nibp cardiac and spo2 monitors. Side railsx2 call light within each.

## 2024-02-08 NOTE — SLP NOTE
ADULT SWALLOWING EVALUATION    ASSESSMENT    ASSESSMENT/OVERALL IMPRESSION:  PPE REQUIRED. THIS THERAPIST WORE GLOVES, DROPLET MASK, AND GOGGLES FOR DURATION OF EVALUATION. HANDS WASHED UPON ENTRANCE/EXIT.    SLP BSSE orders received and acknowledged. A swallow evaluation warranted secondary to stroke protocol. Pt afebrile with weak/hoarse vocal quality, on room air, with oxygen saturation at 96. Pt with prior hx of dysphagia at [facility] in which last recommended diet was puree/thin per BSSE on 11/17/23. Pt positioned upright degrees in bed with son at bedside who volunteered to interpret. Pt with complaints of pain in throat, RN aware. Pt's son reports pt on \"soft food\" at bedside and takes pills crushed in honey at home. Pt reportedly coughs immediately with thin liquids at home but does okay with thicker consistencies. Oral [facility] exam completed and overall reduced strength and rate of motion observed. Pt with adequate oral acceptance and bilabial seal across all trials. Pt with increased CHARITY. Pt's swallow response appears delayed/uncoordinated with reduced hyolaryngeal elevation/excursion. No clinical signs of aspiration (e.g., immediate/delayed throat clear, immediate/delayed cough, wet vocal quality, increased O2 effort) observed across all trials of puree and moderately thick liquids via small controlled sips via open cup and tsp trials. OVERT signs/symptoms of aspiration observed with thin and mildly thick liquids as evidenced by audible gulping with immediate coughing, throat clearing, and wet vocal quality. Trials discontinued. Oral/buccal cavities clear of residue following all trial. Oxygen status remained >93 t/o the entire evaluation.     At this time, pt presents with mild oral dysphagia and probable pharyngeal dysfunction. Recommend a PUREE diet and MODERATELY THICK liquids VIA TSP with strict adherence to safe swallowing compensatory strategies. Results and recommendations reviewed with RN, pt, and family.  Pt/pt's family v/u to all results/recommendations but would benefit from continued education. Recommendations remain written on whiteboard.     PLAN: SLP to f/u x3 meal assessment, monitor imaging, and VFSS if clinically warranted.     RECOMMENDATIONS   Diet Recommendations - Solids: Puree  Diet Recommendations - Liquids: Honey thick liquids/ Moderately thick (via tsp)    Compensatory Strategies Recommended: Liquids via spoon;No straws;Slow rate;Alternate consistencies;Small bites and sips  Aspiration Precautions: Upright position;Slow rate;Small bites and sips;No straw  Medication Administration Recommendations: Crushed in puree  Treatment Plan/Recommendations: Aspiration precautions    HISTORY   MEDICAL HISTORY  Reason for Referral: R/O aspiration    Problem List  Principal Problem:    Acute CVA (cerebrovascular accident) (HCC)      Past Medical History  Past Medical History:   Diagnosis Date    Asthma     Cancer (HCC)     Essential hypertension        Prior Living Situation: Home with support  Diet Prior to Admission: Thin liquids (\"soft\")  Precautions: Aspiration; needed    Patient/Family Goals: Pt's son reports pt coughs immediately with thin liquids at home.     SWALLOWING HISTORY  Current Diet Consistency: NPO  Dysphagia History:     VFSS 10/12/17 @ [facility]: no aspiration    BSE 11/17/23: puree/thin    Imaging Results:     MRI BRAIN 2/7/24:  CONCLUSION:   1. Acute right frontal, periinsular lacunar infarct related to small vessel disease.   2. Moderate changes of chronic small vessel disease in cerebral white matter.   3. Pathologic signal in the right temporal lobe probably represents an old subcortical infarct/insult.   4. Changes of chronic small vessel disease in fransico.         Dictated by (CST): Wilton Ralph MD on 2/07/2024 at 6:37 PM       Finalized by (CST): Wilton Ralph MD on 2/07/2024 at 6:44 PM     OBJECTIVE   ORAL MOTOR EXAMINATION  Dentition: Upper dentures;Lower dentures  Symmetry:  Within Functional Limits  Strength:  (reduced)     Range of Motion: Within Functional Limits  Rate of Motion: Reduced    Voice Quality: Hoarse;Weak  Respiratory Status: Unlabored  Consistencies Trialed: Thin liquids;Nectar thick liquids;Honey thick liquids;Puree  Method of Presentation: Self presentation;Staff/Clinician assistance;Spoon;Cup;Single sips  Patient Positioning: Upright;Midline    Oral Phase of Swallow: Impaired  Bolus Retrieval: Intact  Bilabial Seal: Intact  Bolus Formation: Impaired  Bolus Propulsion: Impaired          Pharyngeal Phase of Swallow: Impaired  Laryngeal Elevation Timing: Appears impaired  Laryngeal Elevation Strength: Appears impaired     (Please note: Silent aspiration cannot be evaluated clinically. Videofluoroscopic Swallow Study is required to rule-out silent aspiration.)    Esophageal Phase of Swallow: No complaints consistent with possible esophageal involvement    GOALS  Goal #1 The patient will tolerate puree consistency and moderately thick liquids without overt signs or symptoms of aspiration with 100 % accuracy over 3 session(s).  In Progress   Goal #2 The patient/family/caregiver will demonstrate understanding and implementation of aspiration precautions and swallow strategies independently over 3 session(s).    In Progress   Goal #3 The patient will utilize compensatory strategies as outlined by  BSSE (clinical evaluation) including Slow rate, Small bites, Multiple swallows, Alternate liquids/solids, No straws, Upright 90 degrees, Liquids via tsp amount only, Eliminate distractions, Feed patient with 1:1 feed assistance 100 % of the time across 2 sessions.  In Progress   Goal #4 The patient will tolerate trial upgrade of minced and moist consistency and mildly thick liquids without overt signs or symptoms of aspiration with 100 % accuracy over 2-3 session(s).    In Progress     FOLLOW UP  Treatment Plan/Recommendations: Aspiration precautions  Number of Visits to Meet  Established Goals: 3  Follow Up Needed (Documentation Required): Yes  SLP Follow-up Date: 02/09/24    Thank you for your referral.   If you have any questions, please contact MYLES Altamirano M.S. CCC-SLP  Speech Language Pathologist  Phone Number [phone]

## 2024-02-08 NOTE — H&P
[facility]    PATIENT'S NAME: AYLA CASTANO   ATTENDING PHYSICIAN: Ayla Swann MD   PATIENT ACCOUNT#:   [id number]    LOCATION:  [address] [place] [address]  MEDICAL RECORD #:   [id number]       YOB: 1923  ADMISSION DATE:       02/07/2024    HISTORY AND PHYSICAL EXAMINATION    CHIEF COMPLAINT:  This is a 89-year-old male with a history of hypertension who presented to the emergency room with left-sided weakness and accidental fall.    HISTORY OF PRESENT ILLNESS:  Information was obtained from the patient as well as son who is at the bedside.  He stated that lately patient has fallen down more often and also had mild weakness of the left hand.  The patient has not had any speech weakness or swallowing problem.  Because of the progressive weakness and uncontrolled hypertension, patient was brought to the emergency room at [facility].  In the ER the patient had an extensive workup done including MRI of the brain which showed right frontal acute infarction and also chronic changes in the cerebrum as a response.  The patient also had a CT scan of the thoracic spine which showed acute compression fracture.  The patient's troponin was unremarkable and potassium was slightly elevated.  The patient was started on IV fluids and is going to be admitted to telemonitor unit.  The patient is kept n.p.o. until bedside swallow test will be done.    PAST MEDICAL HISTORY:  Significant for hypertension and generalized weakness.    MEDICATIONS:  Reviewed and reconciled.    ALLERGIES:  Not known.    FAMILY HISTORY:  Noncontributory.    SOCIAL HISTORY:  The patient lives with his son.  He is active at home, but he needs assistance lately more often.  The patient never smoked cigarettes or drank alcohol.  No history of illicit drug use.  He keeps himself busy.  Patient uses a walker.    REVIEW OF SYSTEMS:  The patient denied dizziness, ear pain, sore throat, swallowing difficulty, chest pain,  shortness of breath, nausea, vomiting, diarrhea, leg swelling or calf swelling, tremors, or open wounds.  Also, the patient denied loss of consciousness.      PHYSICAL EXAMINATION:    GENERAL:  Patient is seen in the emergency room lying down without any distress.  He is a slender-built male.  VITAL SIGNS:  Blood pressure 170/92, pulse 88 per minute and regular, respirations 18.  He is afebrile.  HEENT:  Dryness of mouth, otherwise unremarkable.  NECK:  Supple without any lymph node enlargement or thyroid enlargement.  HEART:  Normal first and second heart sounds without any murmur.  LUNGS:  Clear bilaterally without any wheezing.  ABDOMEN:  Soft without any tenderness and bowel sounds are active.  EXTREMITIES:  No calf tenderness or edema.  NEUROLOGIC:  Mild weakness of left hand , otherwise moves all extremities.  Patient follows all commands.    CLINICAL ASSESSMENT:    1.   Acute frontal lobe stroke.  2.   Mild hyperkalemia.    3.   Uncontrolled hypertension.  4.   Anemia.  5.   Thoracic compression fracture.  6.   Hypothyroidism.    PLAN:    1.   Keep the patient on telemonitor.   2.   I had a long discussion with the patient and his son.  All their questions were answered to their satisfaction.  They verbalized full understanding.  3.   Home medications reviewed and reconciled.   4.   Labs ordered for the morning.    5.   Bedside swallow test.  Until then, patient is n.p.o.   6.   IV fluids will be continued, normal saline at 75 mL an hour.  7.   Neurology consultation is obtained.  8.   Protonix 40 mg IV daily for GI prophylaxis.  9.   Lovenox 30 mg subcutaneous daily for DVT prophylaxis.    Dictated By Ayla Swann MD  d: 02/08/2024 07:24:50  t: 02/08/2024 08:18:34  Job [id number]  [facility]/

## 2024-02-08 NOTE — ED QUICK NOTES
Rounding completed    No complaints at this time  Awaiting lab/imaging results  Elimination assistance offered  No additional needs at this time  Call light within reach, will update patient with more information  Will continue to monitor

## 2024-02-08 NOTE — ED QUICK NOTES
Patient requires soft food diet due and medication to be crushed due to his swallowing abilities. Tolerates crushed medication in pudding or applesauce.

## 2024-02-08 NOTE — ED QUICK NOTES
Orders for admission, patient is aware of plan and ready to go upstairs. Any questions, please call ED RN Maryann at extension [phone] .     Patient Covid vaccination status: Fully vaccinated     COVID Test Ordered in ED: None    COVID Suspicion at Admission: N/A    Running Infusions:  None    Mental Status/LOC at time of transport: Aox2    Other pertinent information: ambulatory with assistance and walker  CIWA score: N/A   NIH score:  N/A

## 2024-02-08 NOTE — PLAN OF CARE
A&OX4, up with one and walker. Neuro Q4 continued. IV fluids infusing. Speech downgraded diet. Family educated by Rn and ST about thickened liquids and risk of aspiration multiple times.  Son at bedside and updated on plan of care   Problem: Patient Centered Care  Goal: Patient preferences are identified and integrated in the patient's plan of care  Description: Interventions:  - What would you like us to know as we care for you? From home with son   - Provide timely, complete, and accurate information to patient/family  - Incorporate patient and family knowledge, values, beliefs, and cultural backgrounds into the planning and delivery of care  - Encourage patient/family to participate in care and decision-making at the level they choose  - Honor patient and family perspectives and choices  Outcome: Progressing     Problem: Patient/Family Goals  Goal: Patient/Family Long Term Goal  Description: Patient's Long Term Goal: go home    Interventions:  - follow plan of care   - See additional Care Plan goals for specific interventions  Outcome: Progressing  Goal: Patient/Family Short Term Goal  Description: Patient's Short Term Goal: eat food    Interventions:   - see ST   - See additional Care Plan goals for specific interventions  Outcome: Progressing     Problem: CARDIOVASCULAR - ADULT  Goal: Maintains optimal cardiac output and hemodynamic stability  Description: INTERVENTIONS:  - Monitor vital signs, rhythm, and trends  - Monitor for bleeding, hypotension and signs of decreased cardiac output  - Evaluate effectiveness of vasoactive medications to optimize hemodynamic stability  - Monitor arterial and/or venous puncture sites for bleeding and/or hematoma  - Assess quality of pulses, skin color and temperature  - Assess for signs of decreased coronary artery perfusion - ex. Angina  - Evaluate fluid balance, assess for edema, trend weights  Outcome: Progressing  Goal: Absence of cardiac arrhythmias or at  baseline  Description: INTERVENTIONS:  - Continuous cardiac monitoring, monitor vital signs, obtain 12 lead EKG if indicated  - Evaluate effectiveness of antiarrhythmic and heart rate control medications as ordered  - Initiate emergency measures for life threatening arrhythmias  - Monitor electrolytes and administer replacement therapy as ordered  Outcome: Progressing     Problem: METABOLIC/FLUID AND ELECTROLYTES - ADULT  Goal: Glucose maintained within prescribed range  Description: INTERVENTIONS:  - Monitor Blood Glucose as ordered  - Assess for signs and symptoms of hyperglycemia and hypoglycemia  - Administer ordered medications to maintain glucose within target range  - Assess barriers to adequate nutritional intake and initiate nutrition consult as needed  - Instruct patient on self management of diabetes  Outcome: Progressing  Goal: Electrolytes maintained within normal limits  Description: INTERVENTIONS:  - Monitor labs and rhythm and assess patient for signs and symptoms of electrolyte imbalances  - Administer electrolyte replacement as ordered  - Monitor response to electrolyte replacements, including rhythm and repeat lab results as appropriate  - Fluid restriction as ordered  - Instruct patient on fluid and nutrition restrictions as appropriate  Outcome: Progressing     Problem: NEUROLOGICAL - ADULT  Goal: Achieves stable or improved neurological status  Description: INTERVENTIONS  - Assess for and report changes in neurological status  - Initiate measures to prevent increased intracranial pressure  - Maintain blood pressure and fluid volume within ordered parameters to optimize cerebral perfusion and minimize risk of hemorrhage  - Monitor temperature, glucose, and sodium. Initiate appropriate interventions as ordered  Outcome: Progressing  Goal: Absence of seizures  Description: INTERVENTIONS  - Monitor for seizure activity  - Administer anti-seizure medications as ordered  - Monitor neurological  status  Outcome: Progressing  Goal: Remains free of injury related to seizure activity  Description: INTERVENTIONS:  - Maintain airway, patient safety  and administer oxygen as ordered  - Monitor patient for seizure activity, document and report duration and description of seizure to MD/LIP  - If seizure occurs, turn patient to side and suction secretions as needed  - Reorient patient post seizure  - Seizure pads on all 4 side rails  - Instruct patient/family to notify RN of any seizure activity  - Instruct patient/family to call for assistance with activity based on assessment  Outcome: Progressing  Goal: Achieves maximal functionality and self care  Description: INTERVENTIONS  - Monitor swallowing and airway patency with patient fatigue and changes in neurological status  - Encourage and assist patient to increase activity and self care with guidance from PT/OT  - Encourage visually impaired, hearing impaired and aphasic patients to use assistive/communication devices  Outcome: Progressing     Problem: Impaired Functional Mobility  Goal: Achieve highest/safest level of mobility/gait  Description: Interventions:  - Assess patient's functional ability and stability  - Promote increasing activity/tolerance for mobility and gait  - Educate and engage patient/family in tolerated activity level and precautions  - Recommend use of chair position in bed 3 times per day  Outcome: Progressing     Problem: Impaired Swallowing  Goal: Minimize aspiration risk  Description: Interventions:  - Patient should be alert and upright for all feedings (90 degrees preferred)  - Offer food and liquids at a slow rate  - No straws  - Encourage small bites of food and small sips of liquid  - Offer pills one at a time, crush or deliver with applesauce as needed  - Discontinue feeding and notify MD (or speech pathologist) if coughing or persistent throat clearing or wet/gurgly vocal quality is noted  Outcome: Progressing     Problem: RISK FOR  INFECTION - ADULT  Goal: Absence of fever/infection during anticipated neutropenic period  Description: INTERVENTIONS  - Monitor WBC  - Administer growth factors as ordered  - Implement neutropenic guidelines  Outcome: Progressing     Problem: SAFETY ADULT - FALL  Goal: Free from fall injury  Description: INTERVENTIONS:  - Assess pt frequently for physical needs  - Identify cognitive and physical deficits and behaviors that affect risk of falls.  - Marilla fall precautions as indicated by assessment.  - Educate pt/family on patient safety including physical limitations  - Instruct pt to call for assistance with activity based on assessment  - Modify environment to reduce risk of injury  - Provide assistive devices as appropriate  - Consider OT/PT consult to assist with strengthening/mobility  - Encourage toileting schedule  Outcome: Progressing     Problem: DISCHARGE PLANNING  Goal: Discharge to home or other facility with appropriate resources  Description: INTERVENTIONS:  - Identify barriers to discharge w/pt and caregiver  - Include patient/family/discharge partner in discharge planning  - Arrange for needed discharge resources and transportation as appropriate  - Identify discharge learning needs (meds, wound care, etc)  - Arrange for interpreters to assist at discharge as needed  - Consider post-discharge preferences of patient/family/discharge partner  - Complete POLST form as appropriate  - Assess patient's ability to be responsible for managing their own health  - Refer to Case Management Department for coordinating discharge planning if the patient needs post-hospital services based on physician/LIP order or complex needs related to functional status, cognitive ability or social support system  Outcome: Progressing

## 2024-02-08 NOTE — OCCUPATIONAL THERAPY NOTE
OCCUPATIONAL THERAPY EVALUATION - INPATIENT     Room Number: 339/339-A  Evaluation Date: 2/8/2024  Type of Evaluation: Initial  Presenting Problem: forgetful, falls, dizziness, elevated BP; acute R frontal lacunar infarct    Physician Order: IP Consult to Occupational Therapy  Reason for Therapy: ADL/IADL Dysfunction and Discharge Planning    OCCUPATIONAL THERAPY ASSESSMENT   Patient is a 89 year old male admitted 2/7/2024 for acute infarct.  Prior to admission, patient's baseline is assist as needed for ADLs by family, ambulates with RW and family provides 24/7 SPV/assist and lives with son.  Patient is currently functioning below baseline with toileting, bathing, lower body dressing, grooming, eating, bed mobility, transfers, dynamic sitting balance, static standing balance, dynamic standing balance, and functional standing tolerance.  Patient is requiring supervision and moderate assist as a result of the following impairments: decreased functional strength, decreased endurance, impaired   balance, and cognitive deficits (further evaluation/assessment warrented). Occupational Therapy will continue to follow for duration of hospitalization.    Patient will benefit from continued skilled OT Services at discharge to promote functional independence in home.  Anticipate patient will return home with home health OT vs no needs    PLAN  OT Treatment Plan: Balance activities;ADL training;Functional transfer training;UE strengthening/ROM;Cognitive reorientation;Patient/Family education;Patient/Family training;Endurance training;Equipment eval/education;Compensatory technique education     OCCUPATIONAL THERAPY MEDICAL/SOCIAL HISTORY   Problem List  Principal Problem:    Acute CVA (cerebrovascular accident) (HCC)    HOME SITUATION  Type of Home: House  Home Layout: Able to live on main level  Lives With: Son  Toilet and Equipment: Standard height toilet  Drives: No        Prior Level of [place]: 24/7 SPV/assist from  family, RW for mobility; assist with ADLs as needed    SUBJECTIVE  \"I feel short of breath after I drank that water\"- pt and family reported drinking regular water prior to OT visit. Pt and family educated on safety of maintaining SLP recs for diet.    OCCUPATIONAL THERAPY EXAMINATION    OBJECTIVE  Precautions: Bed/chair alarm  Fall Risk: Standard fall risk    PAIN ASSESSMENT  Ratin    ACTIVITY TOLERANCE  Pulse: 77             O2 SATURATIONS  Oxygen Therapy  SPO2% on Room Air at Rest: 97  SPO2% Ambulation on Room Air: 92 (improving to 95% within a few seconds with seated rest)    COGNITION  Overall Cognitive Status:  Impaired  Attention Span:  appears intact  Following Commands:  follows one step commands without difficulty and follows multistep commands with increased time  More formal assessment warranted in patient's native language next session    VISION  Current Vision: no visual deficits during functional activities      Communication: WFL- pt speaks Turkmen and prefers pt's family to interpret during session. Pt's grandson present and able to interpret    Behavioral/Emotional/Social: [facility]    RANGE OF MOTION   Upper extremity ROM is within functional limits     STRENGTH ASSESSMENT  Upper extremity strength is within functional limits     COORDINATION  Gross Motor: [facility]   Fine Motor: [facility]     ACTIVITIES OF DAILY LIVING ASSESSMENT  -[facility] ‘6-Clicks’ Inpatient Daily Activity Short Form  How much help from another person does the patient currently need…  -   Putting on and taking off regular lower body clothing?: A Little  -   Bathing (including washing, rinsing, drying)?: A Little  -   Toileting, which includes using toilet, bedpan or urinal? : A Little  -   Putting on and taking off regular upper body clothing?: A Little  -   Taking care of personal grooming such as brushing teeth?: A Little  -   Eating meals?: A Little    AM-PAC Score:  Score: 18  Approx Degree of Impairment: 46.65%  Standardized Score (AM-PAC  Scale): 38.66  CMS Modifier (G-Code): CK    FUNCTIONAL TRANSFER ASSESSMENT  Sit to Stand: Edge of Bed  Edge of Bed: Contact Guard Assist    BED MOBILITY  Supine to Sit : Contact Guard Assist    BALANCE ASSESSMENT  Static Sitting: Contact Guard Assist  Static Standing: Contact Guard Assist    FUNCTIONAL ADL ASSESSMENT  Eating: Supervision (pt is on modified diet, pt and grandson educated on diet recs made by SLP (puree foods, mod thick liquids with teaspoon, no straw and 1:1 feeding). Pt and family require SPV to maintain diet recs)  Grooming Seated: Supervision (washing face)  LB Dressing Seated: Moderate Assist (socks)  Toileting Standing: Contact Guard Assist (simulated in session with reaching)       Skilled Therapy Provided:   OT PPE includes: gloves, and surgical mask. RN cleared pt for OT.Upon entry into pt's room, pt reported of SOB after sipping regular water that grandson provided to him prior to OT coming into the room. SPO2 WFL. Ths OT explained the importance of maintaining diet recs as recommended by SLP for safety. Pt's grandson reported giving patient a few sips of water and this OT reinforced importance of maintaining recommended diet for safety. Explained reasoning why should be maintained and health risks that could develop if water was provided against the recs of SLP. End of session, RN aware and she reported that she has explained this info to the family several times today. Pt left in chaie with all needs met with chair alarm on.    EDUCATION PROVIDED  Patient: Role of Occupational Therapy; Functional Transfer Techniques; Posture/Positioning; Compensatory ADL Techniques  Patient's Response to Education: Verbalized Understanding; Returned Demonstration; Requires Further Education  Family/Caregiver: Other (importance of maintaining diet recs as recommended by SLP for safety. Pt's grandson reported giving patient a few sips of water and this OT reinforced importance of maintaining recommended diet  for safety)  Family/Caregiver's Response to Education: Requires Further Education (May require further education, RN reported she explained importance of maintaining recommended diet to pt and family multiple times earlier today)    The patient's Approx Degree of Impairment: 46.65% has been calculated based on documentation in the [facility] '6 clicks' Inpatient Daily Activity Short Form.  Research supports that patients with this level of impairment may benefit from HHOT.  Final disposition will be made by interdisciplinary medical team.     Patient End of Session: Up in chair;Needs met;Call light within reach;RN aware of session/findings;All patient questions and concerns addressed;Alarm set;Family present    OT Goals  Patients self stated goal is: did not state     Patient will complete functional transfer with SPV  Comment:     Patient will complete toileting with SPV  Comment:     Patient will tolerate standing for 8 minutes in prep for adls with SPV   Comment:    Patient will complete item retrieval with SPV  Comment:          Goals  on: 2024  Frequency: 3-5x/wk    Patient Evaluation Complexity Level:   Occupational Profile/Medical History MODERATE - Expanded review of history including review of medical or therapy record   Specific performance deficits impacting engagement in ADL/IADL MODERATE  3 - 5 performance deficits   Client Assessment/Performance Deficits MODERATE - Comorbidities and min to mod modifications of tasks    Clinical Decision Making MODERATE - Analysis of occupational profile, detailed assessments, several treatment options    Overall Complexity MODERATE     OT Session Time: 25 minutes    Therapeutic Activity: 15 minutes  10 min garo Kaur OTR/L

## 2024-02-08 NOTE — ED QUICK NOTES
Patient requesting to eat, offered after hours meal. Patient is vegetarian and his son states he will get food from home. Patient and family aware of admission.

## 2024-02-09 VITALS
RESPIRATION RATE: 18 BRPM | TEMPERATURE: 98 F | WEIGHT: 108.38 LBS | DIASTOLIC BLOOD PRESSURE: 96 MMHG | BODY MASS INDEX: 21.28 KG/M2 | HEIGHT: 60 IN | HEART RATE: 86 BPM | SYSTOLIC BLOOD PRESSURE: 158 MMHG | OXYGEN SATURATION: 95 %

## 2024-02-09 LAB
ALBUMIN SERPL-MCNC: 3.6 G/DL (ref 3.2–4.8)
ALBUMIN/GLOB SERPL: 1.2 {RATIO} (ref 1–2)
ALP LIVER SERPL-CCNC: 104 U/L
ALT SERPL-CCNC: 14 U/L
ANION GAP SERPL CALC-SCNC: 6 MMOL/L (ref 0–18)
AST SERPL-CCNC: 20 U/L (ref ?–34)
BASOPHILS # BLD AUTO: 0.03 X10(3) UL (ref 0–0.2)
BASOPHILS NFR BLD AUTO: 0.4 %
BILIRUB SERPL-MCNC: 0.5 MG/DL (ref 0.2–0.9)
BUN BLD-MCNC: 17 MG/DL (ref 9–23)
BUN/CREAT SERPL: 19.3 (ref 10–20)
CALCIUM BLD-MCNC: 8.3 MG/DL (ref 8.7–10.4)
CHLORIDE SERPL-SCNC: 103 MMOL/L (ref 98–112)
CO2 SERPL-SCNC: 24 MMOL/L (ref 21–32)
CREAT BLD-MCNC: 0.88 MG/DL
DEPRECATED RDW RBC AUTO: 42.1 FL (ref 35.1–46.3)
EGFRCR SERPLBLD CKD-EPI 2021: 77 ML/MIN/1.73M2 (ref 60–?)
EOSINOPHIL # BLD AUTO: 0.04 X10(3) UL (ref 0–0.7)
EOSINOPHIL NFR BLD AUTO: 0.6 %
ERYTHROCYTE [DISTWIDTH] IN BLOOD BY AUTOMATED COUNT: 13.6 % (ref 11–15)
GLOBULIN PLAS-MCNC: 2.9 G/DL (ref 2.8–4.4)
GLUCOSE BLD-MCNC: 101 MG/DL (ref 70–99)
GLUCOSE BLDC GLUCOMTR-MCNC: 110 MG/DL (ref 70–99)
GLUCOSE BLDC GLUCOMTR-MCNC: 159 MG/DL (ref 70–99)
GLUCOSE BLDC GLUCOMTR-MCNC: 98 MG/DL (ref 70–99)
HCT VFR BLD AUTO: 32.2 %
HGB BLD-MCNC: 10.7 G/DL
IMM GRANULOCYTES # BLD AUTO: 0.04 X10(3) UL (ref 0–1)
IMM GRANULOCYTES NFR BLD: 0.6 %
LYMPHOCYTES # BLD AUTO: 1.56 X10(3) UL (ref 1–4)
LYMPHOCYTES NFR BLD AUTO: 22.8 %
MCH RBC QN AUTO: 28.6 PG (ref 26–34)
MCHC RBC AUTO-ENTMCNC: 33.2 G/DL (ref 31–37)
MCV RBC AUTO: 86.1 FL
MONOCYTES # BLD AUTO: 0.69 X10(3) UL (ref 0.1–1)
MONOCYTES NFR BLD AUTO: 10.1 %
NEUTROPHILS # BLD AUTO: 4.48 X10 (3) UL (ref 1.5–7.7)
NEUTROPHILS # BLD AUTO: 4.48 X10(3) UL (ref 1.5–7.7)
NEUTROPHILS NFR BLD AUTO: 65.5 %
OSMOLALITY SERPL CALC.SUM OF ELEC: 278 MOSM/KG (ref 275–295)
PLATELET # BLD AUTO: 271 10(3)UL (ref 150–450)
POTASSIUM SERPL-SCNC: 4.4 MMOL/L (ref 3.5–5.1)
PROT SERPL-MCNC: 6.5 G/DL (ref 5.7–8.2)
RBC # BLD AUTO: 3.74 X10(6)UL
SODIUM SERPL-SCNC: 133 MMOL/L (ref 136–145)
WBC # BLD AUTO: 6.8 X10(3) UL (ref 4–11)

## 2024-02-09 RX ORDER — ATORVASTATIN CALCIUM 20 MG/1
20 TABLET, FILM COATED ORAL NIGHTLY
Qty: 60 TABLET | Refills: 0 | Status: SHIPPED | OUTPATIENT
Start: 2024-02-09

## 2024-02-09 RX ORDER — ASPIRIN 81 MG/1
81 TABLET, CHEWABLE ORAL DAILY
Qty: 60 TABLET | Refills: 0 | Status: SHIPPED | OUTPATIENT
Start: 2024-02-10

## 2024-02-09 RX ORDER — LISINOPRIL 5 MG/1
5 TABLET ORAL 2 TIMES DAILY
Qty: 120 TABLET | Refills: 0 | Status: SHIPPED | OUTPATIENT
Start: 2024-02-09

## 2024-02-09 RX ORDER — HEPARIN SODIUM (PORCINE) LOCK FLUSH IV SOLN 100 UNIT/ML 100 UNIT/ML
500 SOLUTION INTRAVENOUS ONCE
Status: COMPLETED | OUTPATIENT
Start: 2024-02-09 | End: 2024-02-09

## 2024-02-09 NOTE — DISCHARGE PLANNING
Patient and his son were provided with discharge instructions, education, and follow up information. Patient and his son declined a  with preference for his son to translate. Prescriptions were already sent electronically to patient's pharmacy. Patient's son verbalizes understanding of follow up information, specifically following up, medications prescribed, dose, timing, purpose and side effects, 30 day cardiac monitor, stroke signs and symptoms and discharge instructions. Patient has no questions after reviewing all instructions and will be going home with his son.     Teresa PARADA, Discharge Leader [id number]

## 2024-02-09 NOTE — PLAN OF CARE
Patient is Aox4, mumbles at times but is baseline per son. NIH daily, neurochecks q4hr. Patient irritable and refusing care at times, stating \"I want to go home\" and \"Let me put on my clothes\" per family. No acute distress, denies pain; reported sore throat at night but refused PRN cepacol, stating \"Leave me alone, I just want to sleep.\" On room air. 1 assist with walker. Diet recommendations reinforced. Unit draw via R chest port. BP elevated again this morning, pt refusing medications at this time; will endorse to day shift. Discharge home today when cleared, pending BP improvement.     Problem: CARDIOVASCULAR - ADULT  Goal: Maintains optimal cardiac output and hemodynamic stability  Description: INTERVENTIONS:  - Monitor vital signs, rhythm, and trends  - Monitor for bleeding, hypotension and signs of decreased cardiac output  - Evaluate effectiveness of vasoactive medications to optimize hemodynamic stability  - Monitor arterial and/or venous puncture sites for bleeding and/or hematoma  - Assess quality of pulses, skin color and temperature  - Assess for signs of decreased coronary artery perfusion - ex. Angina  - Evaluate fluid balance, assess for edema, trend weights  Outcome: Progressing     Problem: NEUROLOGICAL - ADULT  Goal: Achieves stable or improved neurological status  Description: INTERVENTIONS  - Assess for and report changes in neurological status  - Initiate measures to prevent increased intracranial pressure  - Maintain blood pressure and fluid volume within ordered parameters to optimize cerebral perfusion and minimize risk of hemorrhage  - Monitor temperature, glucose, and sodium. Initiate appropriate interventions as ordered  Outcome: Progressing

## 2024-02-09 NOTE — CM/SW NOTE
Social work received a consult for a  eval.    Therapy is recommending home.    There are ano therapy needs for the patient at this time.    SW/CM to remain available for support and/or discharge planning.     Juliette Hernandez MSW, LSW  Discharge Planner [id number]

## 2024-02-09 NOTE — PROGRESS NOTES
[facility]    Progress Note    Ayla Velez Patient Status:  Inpatient    1923 MRN [id number]   Location [facility] [place] 3W/SW Attending Ayla Swann MD   Hosp Day # 2 PCP AYLA SWANN MD, MD     Chief Complaint: frequent falls and left sided weakness.    Subjective:     Constitutional:  Negative for chills, fatigue and fever.   HENT:  Negative for sore throat.    Eyes:  Negative for pain.   Respiratory:  Negative for chest tightness and wheezing.    Cardiovascular:  Negative for chest pain.   Genitourinary:  Negative for difficulty urinating.   Musculoskeletal:  Negative for back pain.   Neurological:  Negative for seizures and headaches.   Psychiatric/Behavioral:  Negative for agitation.        Objective:   Blood pressure (!) 158/96, pulse 86, temperature 98.2 °F (36.8 °C), temperature source Oral, resp. rate 18, height 5' (1.524 m), weight 108 lb 6.4 oz (49.2 kg), SpO2 95%.  Physical Exam  Vitals reviewed. Exam conducted with a chaperone present.   HENT:      Head: Normocephalic.      Mouth/Throat:      Mouth: Mucous membranes are moist.   Eyes:      Extraocular Movements: Extraocular movements intact.   Cardiovascular:      Rate and Rhythm: Regular rhythm.      Pulses: Normal pulses.   Pulmonary:      Breath sounds: Normal breath sounds.   Abdominal:      Palpations: Abdomen is soft.   Musculoskeletal:      Cervical back: Neck supple.      Comments: Left sided mild weakness   Skin:     General: Skin is warm.   Neurological:      Mental Status: He is alert.   Psychiatric:         Behavior: Behavior normal.         Results:   Lab Results   Component Value Date    WBC 6.8 2024    HGB 10.7 (L) 2024    HCT 32.2 (L) 2024    .0 2024    CREATSERUM 0.88 2024    BUN 17 2024     (L) 2024    K 4.4 2024     2024    CO2 24.0 2024     (H) 2024    CA 8.3 (L) 2024    ALB 3.6 2024     ALKPHO 104 02/09/2024    BILT 0.5 02/09/2024    TP 6.5 02/09/2024    AST 20 02/09/2024    ALT 14 02/09/2024    .0 (H) 12/09/2023    INR 1.00 12/09/2023    T4F 1.1 02/08/2024    TSH 3.219 02/08/2024    MG 1.8 11/20/2023    TROPHS 8 02/07/2024       XR CHEST AP PORTABLE  (CPT=71045)    Result Date: 2/8/2024  CONCLUSION: No new abnormality.  Persistent bibasilar opacities likely representing atelectasis and/or scarring.    Dictated by (CST): Jesus Nicholas MD on 2/08/2024 at 4:20 PM     Finalized by (CST): Jesus Nicholas MD on 2/08/2024 at 4:21 PM          CTA BRAIN + CTA CAROTIDS (CPT=70496/71020)    Result Date: 2/8/2024  CONCLUSION: No large artery occlusion  [facility] radiology provided a prelim report for this exam. This final report has no significant discrepancies with the [facility] report.      Dictated by (CST): Vernon Vu MD on 2/08/2024 at 7:32 AM     Finalized by (CST): Vernon Vu MD on 2/08/2024 at 7:39 AM          MRI BRAIN WO ACUTE (3) SEQUENCE (CPT=70551)    Result Date: 2/7/2024  CONCLUSION:  1. Acute right frontal, periinsular lacunar infarct related to small vessel disease. 2. Moderate changes of chronic small vessel disease in cerebral white matter. 3. Pathologic signal in the right temporal lobe probably represents an old subcortical infarct/insult. 4. Changes of chronic small vessel disease in fransico.   Dictated by (CST): Wilton Ralph MD on 2/07/2024 at 6:37 PM     Finalized by (CST): Wilton Ralph MD on 2/07/2024 at 6:44 PM          CT SPINE CERVICAL (CPT=72125)    Result Date: 2/7/2024  CONCLUSION:  1. Acute-appearing mild superior endplate compression fracture deformity of T1. There is a suspected additional fracture of T3.  2. Multilevel degenerative changes of the cervical spine are perceived.  3. Lesser incidental findings as above.    Dictated by (CST): Yaya Gan MD on 2/07/2024 at 5:48 PM     Finalized by (CST): Yaya Gan MD on 2/07/2024 at 5:51 PM          CT BRAIN OR  HEAD (90066)    Result Date: 2/7/2024  CONCLUSION:  1. Negative for depressed calvarial fracture, coup/contrecoup intraparenchymal contusion, intracranial hemorrhage, or further evidence of acute intracranial process by noncontrast CT technique. If there is clinical concern for acute ischemia, follow-up MRI would have greater sensitivity.  2. Senescent changes of parenchymal volume loss with sequela of chronic microvascular ischemic disease, including chronic lacunar infarcts of the basal ganglia.  3. There is also large vessel atherosclerosis involving the anterior and posterior circulations.  4. Lesser incidental findings as above.      Dictated by (CST): Yaya Gan MD on 2/07/2024 at 5:42 PM     Finalized by (CST): Yaya Gan MD on 2/07/2024 at 5:45 PM         EKG    Result Date: 2/7/2024  Sinus bradycardia with 1st degree A-V block Possible Left atrial enlargement Right bundle branch block Septal infarct (cited on or before 16-NOV-2023) Abnormal ECG When compared with ECG of 09-DEC-2023 08:24, Premature atrial complexes are no longer Present Vent. rate has decreased BY  29 BPM Confirmed by CRISTOPHER MOORE, MATHEW ([id number]) on 2/7/2024 5:18:59 PM     Assessment & Plan:     Acute CVA (cerebrovascular accident) (HCC)  Right frontal stroke.  Hyperkalemia.  Htn.  Anemia.  Thoracic compression fracture.  Hypothyroidism.    P  home today.            CAROLYN OROURKE MD, MD  2/8/2024

## 2024-02-09 NOTE — PLAN OF CARE
Patient alert and oriented x 4. Vitals stable on room air. Patient denies pain. Patient cleared for discharge home with son. AVS completed and discussed to patient and son by discharge nurse. Right chest port de-accessed. There were no further questions. Son to transport patient home.    Problem: Patient Centered Care  Goal: Patient preferences are identified and integrated in the patient's plan of care  Description: Interventions:  - What would you like us to know as we care for you? From home with son   - Provide timely, complete, and accurate information to patient/family  - Incorporate patient and family knowledge, values, beliefs, and cultural backgrounds into the planning and delivery of care  - Encourage patient/family to participate in care and decision-making at the level they choose  - Honor patient and family perspectives and choices  Outcome: Adequate for Discharge     Problem: Patient/Family Goals  Goal: Patient/Family Long Term Goal  Description: Patient's Long Term Goal: go home    Interventions:  - follow plan of care   - See additional Care Plan goals for specific interventions  Outcome: Adequate for Discharge  Goal: Patient/Family Short Term Goal  Description: Patient's Short Term Goal: eat food    Interventions:   - see ST   - See additional Care Plan goals for specific interventions  Outcome: Adequate for Discharge     Problem: CARDIOVASCULAR - ADULT  Goal: Maintains optimal cardiac output and hemodynamic stability  Description: INTERVENTIONS:  - Monitor vital signs, rhythm, and trends  - Monitor for bleeding, hypotension and signs of decreased cardiac output  - Evaluate effectiveness of vasoactive medications to optimize hemodynamic stability  - Monitor arterial and/or venous puncture sites for bleeding and/or hematoma  - Assess quality of pulses, skin color and temperature  - Assess for signs of decreased coronary artery perfusion - ex. Angina  - Evaluate fluid balance, assess for edema, trend  weights  Outcome: Adequate for Discharge  Goal: Absence of cardiac arrhythmias or at baseline  Description: INTERVENTIONS:  - Continuous cardiac monitoring, monitor vital signs, obtain 12 lead EKG if indicated  - Evaluate effectiveness of antiarrhythmic and heart rate control medications as ordered  - Initiate emergency measures for life threatening arrhythmias  - Monitor electrolytes and administer replacement therapy as ordered  Outcome: Adequate for Discharge     Problem: METABOLIC/FLUID AND ELECTROLYTES - ADULT  Goal: Glucose maintained within prescribed range  Description: INTERVENTIONS:  - Monitor Blood Glucose as ordered  - Assess for signs and symptoms of hyperglycemia and hypoglycemia  - Administer ordered medications to maintain glucose within target range  - Assess barriers to adequate nutritional intake and initiate nutrition consult as needed  - Instruct patient on self management of diabetes  Outcome: Adequate for Discharge  Goal: Electrolytes maintained within normal limits  Description: INTERVENTIONS:  - Monitor labs and rhythm and assess patient for signs and symptoms of electrolyte imbalances  - Administer electrolyte replacement as ordered  - Monitor response to electrolyte replacements, including rhythm and repeat lab results as appropriate  - Fluid restriction as ordered  - Instruct patient on fluid and nutrition restrictions as appropriate  Outcome: Adequate for Discharge     Problem: NEUROLOGICAL - ADULT  Goal: Achieves stable or improved neurological status  Description: INTERVENTIONS  - Assess for and report changes in neurological status  - Initiate measures to prevent increased intracranial pressure  - Maintain blood pressure and fluid volume within ordered parameters to optimize cerebral perfusion and minimize risk of hemorrhage  - Monitor temperature, glucose, and sodium. Initiate appropriate interventions as ordered  Outcome: Adequate for Discharge  Goal: Absence of  seizures  Description: INTERVENTIONS  - Monitor for seizure activity  - Administer anti-seizure medications as ordered  - Monitor neurological status  Outcome: Adequate for Discharge  Goal: Remains free of injury related to seizure activity  Description: INTERVENTIONS:  - Maintain airway, patient safety  and administer oxygen as ordered  - Monitor patient for seizure activity, document and report duration and description of seizure to MD/LIP  - If seizure occurs, turn patient to side and suction secretions as needed  - Reorient patient post seizure  - Seizure pads on all 4 side rails  - Instruct patient/family to notify RN of any seizure activity  - Instruct patient/family to call for assistance with activity based on assessment  Outcome: Adequate for Discharge  Goal: Achieves maximal functionality and self care  Description: INTERVENTIONS  - Monitor swallowing and airway patency with patient fatigue and changes in neurological status  - Encourage and assist patient to increase activity and self care with guidance from PT/OT  - Encourage visually impaired, hearing impaired and aphasic patients to use assistive/communication devices  Outcome: Adequate for Discharge     Problem: Impaired Functional Mobility  Goal: Achieve highest/safest level of mobility/gait  Description: Interventions:  - Assess patient's functional ability and stability  - Promote increasing activity/tolerance for mobility and gait  - Educate and engage patient/family in tolerated activity level and precautions    Outcome: Adequate for Discharge     Problem: Impaired Swallowing  Goal: Minimize aspiration risk  Description: Interventions:  - Patient should be alert and upright for all feedings (90 degrees preferred)  - Offer food and liquids at a slow rate  - No straws  - Encourage small bites of food and small sips of liquid  - Offer pills one at a time, crush or deliver with applesauce as needed  - Discontinue feeding and notify MD (or speech  pathologist) if coughing or persistent throat clearing or wet/gurgly vocal quality is noted  Outcome: Adequate for Discharge     Problem: RISK FOR INFECTION - ADULT  Goal: Absence of fever/infection during anticipated neutropenic period  Description: INTERVENTIONS  - Monitor WBC  - Administer growth factors as ordered  - Implement neutropenic guidelines  Outcome: Adequate for Discharge     Problem: SAFETY ADULT - FALL  Goal: Free from fall injury  Description: INTERVENTIONS:  - Assess pt frequently for physical needs  - Identify cognitive and physical deficits and behaviors that affect risk of falls.  - [place] fall precautions as indicated by assessment.  - Educate pt/family on patient safety including physical limitations  - Instruct pt to call for assistance with activity based on assessment  - Modify environment to reduce risk of injury  - Provide assistive devices as appropriate  - Consider OT/PT consult to assist with strengthening/mobility  - Encourage toileting schedule  Outcome: Adequate for Discharge     Problem: DISCHARGE PLANNING  Goal: Discharge to home or other facility with appropriate resources  Description: INTERVENTIONS:  - Identify barriers to discharge w/pt and caregiver  - Include patient/family/discharge partner in discharge planning  - Arrange for needed discharge resources and transportation as appropriate  - Identify discharge learning needs (meds, wound care, etc)  - Arrange for interpreters to assist at discharge as needed  - Consider post-discharge preferences of patient/family/discharge partner  - Complete POLST form as appropriate  - Assess patient's ability to be responsible for managing their own health  - Refer to Case Management Department for coordinating discharge planning if the patient needs post-hospital services based on physician/LIP order or complex needs related to functional status, cognitive ability or social support system  Outcome: Adequate for Discharge

## 2024-02-12 ENCOUNTER — PATIENT OUTREACH (OUTPATIENT)
Dept: CASE MANAGEMENT | Age: 89
End: 2024-02-12

## 2024-02-12 ENCOUNTER — TELEPHONE (OUTPATIENT)
Dept: NEUROLOGY | Facility: CLINIC | Age: 89
End: 2024-02-12

## 2024-02-12 NOTE — TELEPHONE ENCOUNTER
Hospital  called and needs an appointment for this Patient in one month from today (around 3/12) and Patient would like to know if they can do a Video visit IN THE AFTERNOON   Please advise where/when appointment can be double booked and if it can be a Video (due to Patient's age)

## 2024-02-12 NOTE — PROGRESS NOTES
Neuro/Stroke apt request (discharged 02/09)    Dr Nimesh Marshall  NEUROLOGY  [facility]  [address] [place] [id number]  [phone]  Follow up 1 month  Dr Marshall's office will call pt, due to no availability in needed time frame  Pt son verbalized understanding  Closing encounter

## 2024-02-12 NOTE — TELEPHONE ENCOUNTER
Spoke to pt to schedule HFU. Pt requesting visit be video. Sent pt text with MyChart set up as requested by patient. Pt to let office know if he has any issues getting MyChart set up.     Pt scheduled for video visit at this time. Pt aware appt may need to be changed to in person if provider feels pt should be seen in person or if pt unable to complete mychart set up.

## 2024-03-25 DIAGNOSIS — I70.1 RENAL ARTERY STENOSIS (HCC): Primary | ICD-10-CM

## 2024-03-27 ENCOUNTER — HOSPITAL ENCOUNTER (OUTPATIENT)
Dept: MRI IMAGING | Facility: HOSPITAL | Age: 89
Discharge: HOME | End: 2024-03-27
Attending: INTERNAL MEDICINE
Payer: MEDICARE

## 2024-03-27 DIAGNOSIS — I70.1 RENAL ARTERY STENOSIS (HCC): ICD-10-CM

## 2024-03-27 PROCEDURE — 74185 MRA ABD W OR W/O CNTRST: CPT | Performed by: INTERNAL MEDICINE

## 2024-03-27 PROCEDURE — A9575 INJ GADOTERATE MEGLUMI 0.1ML: HCPCS | Performed by: INTERNAL MEDICINE

## 2024-03-27 RX ORDER — DIPHENHYDRAMINE HYDROCHLORIDE 50 MG/ML
10 INJECTION, SOLUTION INTRAMUSCULAR; INTRAVENOUS
Status: COMPLETED | OUTPATIENT
Start: 2024-03-27 | End: 2024-03-27

## 2024-03-27 RX ORDER — HEPARIN SODIUM (PORCINE) LOCK FLUSH IV SOLN 100 UNIT/ML 100 UNIT/ML
500 SOLUTION INTRAVENOUS ONCE
Status: COMPLETED | OUTPATIENT
Start: 2024-03-27 | End: 2024-03-27

## 2024-03-27 RX ADMIN — DIPHENHYDRAMINE HYDROCHLORIDE 10 ML: 50 INJECTION, SOLUTION INTRAMUSCULAR; INTRAVENOUS at 11:50:00

## 2024-03-27 RX ADMIN — HEPARIN SODIUM (PORCINE) LOCK FLUSH IV SOLN 100 UNIT/ML 500 UNITS: 100 SOLUTION INTRAVENOUS at 11:44:00

## 2024-08-27 NOTE — ED INITIAL ASSESSMENT (HPI)
Cardiovascular and Thoracic Surgery Post Op Clinic Visit    Patient: Eric Hines Date: 2024   : 1962 Meds: Reviewed and updated in Epic   62 year old male Coumadin: yes     Date and type of surgery:  Complex mitral valve repair (P2 triangular resection, 32mm Physio II annuloplasty ring) and Left atrial appendage occlusion (40mm Atriclip)on 24 at [facility]    Reason for visit: Post-op visit    PCP: Gena Broussard MD   Appointment date: TBD  Cardiologist: Keenan Ng MD  Appointment date: 24    Readmission as an inpatient within 30 days from the date of discharge: No    HPI: Eric Hines is a 62 year old male with a PMH of HTN, HLD, JACKELYN, chondrosarcoma of RLE s/p excision 2002, anxiety, dpression, obesity, and former nicotine dependence who complained of dyspnea. His EKG was abnormal. A TTE shoed highly eccentric mitral regurgitation. A SANTIAGO showed prolapse and partial flail of P2 scallop of mitral valve with moderate to severe eccentric norma-medially directed mitral regurgitation. He was seen by CVT surgery for possible intervention and agreed to undergo surgery on 2024. Post operatively he transferred from the operating room to CVICU in critical but stable condition. He transferred with the support of dobutamine. This was weaned as he became more stable. He awoke from general anesthesia without focal neurological deficits. He was extubated from the ventilator in a timely fashion and placed on oxygen via nasal cannula. His chest tubes were removed and his chest xray remained stable. He was started on a beta blocker but became bradycardic and the beta blocker was stopped. He had signs and symptoms of hypervolemia. He was given diuretics and diuresed well. Cardiac rehab assisted him through a program of graded activity and ambulation. He had orthostatic hypotension that resolved. He was started on coumadin for his mitral valve repair. On post operative day 4, it  Patient with unwitnessed fall in hallway at home about 4 days ago while walking to the bathroom in the middle of the night. Patient does not use walker or assistive device at home. Family states patient has baseline forgetfulness and at that time was unsure of situation and questioned his son itzele he got up after he was lifted from the ground. was determined that he was stable for discharge to home with home health services. Per CMS guidelines, he was discharged on an aspirin, statin, and coumadin.     He was seen in our office today 8/27/2024 for a routine follow-up examination.     His general condition was good with complaints of mild chest wall/incisional pain and he denies sternal instability. Dyspnea with exertion is improving since discharge. BLE edema and weight trending downward since initiation of Furosemide. Brief episode of palpitations 2 days ago with no recurrence. The patient denies dizziness, lightheadedness, chest pain/pressure, dyspnea at rest or orthopnea. He is able to accomplish ADL's without difficulties and has been walking inside daily plus performing arm/leg stretches regularly. The patient has declined participation in cardiac rehab at this time. He denies sleep disturbances. His weight is down 7 lbs when compared to his preoperative weight and he reports a healthy appetite.       Activity/Exercise: Accomplishing all ADL's Walking    Cardiac Rehab: No    Physical Examination    Vitals:    08/27/24 1301 08/27/24 1325 08/27/24 1348   BP: (!) 146/82  (!) 148/78   Pulse: (!) 108 98    Resp: 18     Temp: 97.1 °F (36.2 °C)     SpO2: 98%     Weight: (!) 145.2 kg (320 lb)     Height: 6' 2\" (1.88 m)         Weight Weight: (!) 145.2 kg (320 lb) (08/27/24 1301)   Height Height: 6' 2\" (188 cm) (08/27/24 1301)   BMI BMI (Calculated): 41.09 (08/27/24 1301)     Lungs:Clear to auscultation bilaterally  Heart:Regular rate and rhythm  Incision(s):Sternal Clean, dry and intact and sternum stable  Extremities: +1-2 BLE edema     Diagnostics: none    Impression: Making a satisfactory surgical recovery    Plan: Eric Hines to call with any questions or concerns.  He should continue to follow up with his current physicians including primary care provider Dr. John Broussard and cardiologist Dr. Keenan Ng. Prophylactic antibiotics are  required prior to dental procedures, order already placed for Amoxicillin one hour prior. Patient was advised to avoid lifting, pushing or pulling more than 10 lbs for the first 8 weeks after surgery. Encouraged use of compression stockings, elevation of BLE's and monitoring Na intake. Patient will continue to monitor for worsening BLE edema, weight gain and dyspnea. No further follow up is required with my service although the patient may return to clinic as needed.       Thank you for allowing us to participate in the care of your patient.  If you have any questions or concerns please do not hesitate to contact us.    Shawn Kebede M.D.    CC: MD Keenan Buchanan MD    On 8/27/2024, I, Leonor Saha RN scribed the services personally performed by Shawn Kebede MD.        CARDIAC SURGERY    The documentation recorded by the scribe accurately and completely reflects the service(s) I personally performed and the decisions made by me.     Pt seen and examined, agree with note above.  Doing well following MV repair/ADRIÁN.  We discussed instructions for continuing his recovery.   He is doing well enough that he does not need additional followup with me unless an issue arises, but should continue to follow up with his other physicians as normally scheduled.        Shawn Kebede III, M.D.  [facility]

## 2024-10-11 ENCOUNTER — APPOINTMENT (OUTPATIENT)
Dept: CT IMAGING | Facility: HOSPITAL | Age: 89
End: 2024-10-11
Attending: EMERGENCY MEDICINE
Payer: MEDICARE

## 2024-10-11 ENCOUNTER — APPOINTMENT (OUTPATIENT)
Dept: GENERAL RADIOLOGY | Facility: HOSPITAL | Age: 89
DRG: 178 | End: 2024-10-11
Payer: MEDICARE

## 2024-10-11 ENCOUNTER — HOSPITAL ENCOUNTER (INPATIENT)
Facility: HOSPITAL | Age: 89
LOS: 5 days | Discharge: HOME | End: 2024-10-16
Attending: EMERGENCY MEDICINE | Admitting: INTERNAL MEDICINE
Payer: MEDICARE

## 2024-10-11 ENCOUNTER — HOSPITAL ENCOUNTER (INPATIENT)
Facility: HOSPITAL | Age: 89
End: 2024-10-11
Attending: EMERGENCY MEDICINE | Admitting: INTERNAL MEDICINE
Payer: MEDICARE

## 2024-10-11 ENCOUNTER — APPOINTMENT (OUTPATIENT)
Dept: GENERAL RADIOLOGY | Facility: HOSPITAL | Age: 89
End: 2024-10-11
Payer: MEDICARE

## 2024-10-11 ENCOUNTER — APPOINTMENT (OUTPATIENT)
Dept: GENERAL RADIOLOGY | Facility: HOSPITAL | Age: 89
DRG: 178 | End: 2024-10-11
Attending: INTERNAL MEDICINE
Payer: MEDICARE

## 2024-10-11 ENCOUNTER — APPOINTMENT (OUTPATIENT)
Dept: GENERAL RADIOLOGY | Facility: HOSPITAL | Age: 89
End: 2024-10-11
Attending: INTERNAL MEDICINE
Payer: MEDICARE

## 2024-10-11 ENCOUNTER — APPOINTMENT (OUTPATIENT)
Dept: CT IMAGING | Facility: HOSPITAL | Age: 89
DRG: 178 | End: 2024-10-11
Attending: EMERGENCY MEDICINE
Payer: MEDICARE

## 2024-10-11 ENCOUNTER — HOSPITAL ENCOUNTER (INPATIENT)
Facility: HOSPITAL | Age: 89
LOS: 5 days | Discharge: HOME | DRG: 178 | End: 2024-10-16
Attending: EMERGENCY MEDICINE | Admitting: INTERNAL MEDICINE
Payer: MEDICARE

## 2024-10-11 DIAGNOSIS — J69.0 ASPIRATION PNEUMONITIS (HCC): ICD-10-CM

## 2024-10-11 DIAGNOSIS — J18.9 COMMUNITY ACQUIRED PNEUMONIA OF RIGHT LOWER LOBE OF LUNG: Primary | ICD-10-CM

## 2024-10-11 DIAGNOSIS — R50.9 FEVER, UNSPECIFIED FEVER CAUSE: ICD-10-CM

## 2024-10-11 PROBLEM — E87.5 HYPERKALEMIA: Status: ACTIVE | Noted: 2024-10-11

## 2024-10-11 PROBLEM — R79.89 AZOTEMIA: Status: ACTIVE | Noted: 2024-10-11

## 2024-10-11 PROBLEM — R73.9 HYPERGLYCEMIA: Status: ACTIVE | Noted: 2024-10-11

## 2024-10-11 PROBLEM — D72.829 LEUKOCYTOSIS: Status: ACTIVE | Noted: 2024-10-11

## 2024-10-11 LAB
ANION GAP SERPL CALC-SCNC: 5 MMOL/L (ref 0–18)
ATRIAL RATE: 97 BPM
BASOPHILS # BLD AUTO: 0.03 X10(3) UL (ref 0–0.2)
BASOPHILS NFR BLD AUTO: 0.2 %
BILIRUB UR QL: NEGATIVE
BUN BLD-MCNC: 37 MG/DL (ref 9–23)
BUN/CREAT SERPL: 28 (ref 10–20)
CALCIUM BLD-MCNC: 9 MG/DL (ref 8.7–10.4)
CHLORIDE SERPL-SCNC: 102 MMOL/L (ref 98–112)
CLARITY UR: CLEAR
CO2 SERPL-SCNC: 29 MMOL/L (ref 21–32)
CREAT BLD-MCNC: 1.32 MG/DL
DEPRECATED RDW RBC AUTO: 43.6 FL (ref 35.1–46.3)
EGFRCR SERPLBLD CKD-EPI 2021: 48 ML/MIN/1.73M2 (ref 60–?)
EOSINOPHIL # BLD AUTO: 0.03 X10(3) UL (ref 0–0.7)
EOSINOPHIL NFR BLD AUTO: 0.2 %
ERYTHROCYTE [DISTWIDTH] IN BLOOD BY AUTOMATED COUNT: 13.2 % (ref 11–15)
FLUAV + FLUBV RNA SPEC NAA+PROBE: NEGATIVE
FLUAV + FLUBV RNA SPEC NAA+PROBE: NEGATIVE
GLUCOSE BLD-MCNC: 129 MG/DL (ref 70–99)
GLUCOSE UR-MCNC: NORMAL MG/DL
HCT VFR BLD AUTO: 33.2 %
HGB BLD-MCNC: 10.8 G/DL
IMM GRANULOCYTES # BLD AUTO: 0.04 X10(3) UL (ref 0–1)
IMM GRANULOCYTES NFR BLD: 0.3 %
KETONES UR-MCNC: NEGATIVE MG/DL
LACTATE SERPL-SCNC: 1.3 MMOL/L (ref 0.5–2)
LEUKOCYTE ESTERASE UR QL STRIP.AUTO: NEGATIVE
LYMPHOCYTES # BLD AUTO: 2.32 X10(3) UL (ref 1–4)
LYMPHOCYTES NFR BLD AUTO: 15.8 %
MCH RBC QN AUTO: 29.3 PG (ref 26–34)
MCHC RBC AUTO-ENTMCNC: 32.5 G/DL (ref 31–37)
MCV RBC AUTO: 90.2 FL
MONOCYTES # BLD AUTO: 1.31 X10(3) UL (ref 0.1–1)
MONOCYTES NFR BLD AUTO: 8.9 %
NEUTROPHILS # BLD AUTO: 10.92 X10 (3) UL (ref 1.5–7.7)
NEUTROPHILS # BLD AUTO: 10.92 X10(3) UL (ref 1.5–7.7)
NEUTROPHILS NFR BLD AUTO: 74.6 %
NITRITE UR QL STRIP.AUTO: NEGATIVE
OSMOLALITY SERPL CALC.SUM OF ELEC: 292 MOSM/KG (ref 275–295)
P AXIS: 59 DEGREES
P-R INTERVAL: 220 MS
PH UR: 7 [PH] (ref 5–8)
PLATELET # BLD AUTO: 248 10(3)UL (ref 150–450)
POTASSIUM SERPL-SCNC: 5.4 MMOL/L (ref 3.5–5.1)
Q-T INTERVAL: 348 MS
QRS DURATION: 112 MS
QTC CALCULATION (BEZET): 441 MS
R AXIS: 113 DEGREES
RBC # BLD AUTO: 3.68 X10(6)UL
RBC #/AREA URNS AUTO: >10 /HPF
RSV RNA SPEC NAA+PROBE: NEGATIVE
SARS-COV-2 RNA RESP QL NAA+PROBE: NOT DETECTED
SODIUM SERPL-SCNC: 136 MMOL/L (ref 136–145)
SP GR UR STRIP: 1.02 (ref 1–1.03)
T AXIS: 66 DEGREES
TROPONIN I SERPL HS-MCNC: 12 NG/L
UROBILINOGEN UR STRIP-ACNC: NORMAL
VENTRICULAR RATE: 97 BPM
WBC # BLD AUTO: 14.7 X10(3) UL (ref 4–11)

## 2024-10-11 PROCEDURE — 71045 X-RAY EXAM CHEST 1 VIEW: CPT | Performed by: EMERGENCY MEDICINE

## 2024-10-11 PROCEDURE — 71045 X-RAY EXAM CHEST 1 VIEW: CPT | Performed by: INTERNAL MEDICINE

## 2024-10-11 PROCEDURE — 70450 CT HEAD/BRAIN W/O DYE: CPT | Performed by: EMERGENCY MEDICINE

## 2024-10-11 RX ORDER — MONTELUKAST SODIUM 10 MG/1
10 TABLET ORAL NIGHTLY
Status: DISCONTINUED | OUTPATIENT
Start: 2024-10-11 | End: 2024-10-16

## 2024-10-11 RX ORDER — AMLODIPINE BESYLATE 2.5 MG/1
2.5 TABLET ORAL 2 TIMES DAILY PRN
Status: DISCONTINUED | OUTPATIENT
Start: 2024-10-11 | End: 2024-10-13

## 2024-10-11 RX ORDER — LISINOPRIL 5 MG/1
5 TABLET ORAL 2 TIMES DAILY
Status: DISCONTINUED | OUTPATIENT
Start: 2024-10-11 | End: 2024-10-11

## 2024-10-11 RX ORDER — ATORVASTATIN CALCIUM 20 MG/1
20 TABLET, FILM COATED ORAL DAILY
Status: DISCONTINUED | OUTPATIENT
Start: 2024-10-11 | End: 2024-10-16

## 2024-10-11 RX ORDER — AMLODIPINE BESYLATE 2.5 MG/1
2.5 TABLET ORAL AS NEEDED
COMMUNITY

## 2024-10-11 RX ORDER — MONTELUKAST SODIUM 10 MG/1
10 TABLET ORAL DAILY
Status: DISCONTINUED | OUTPATIENT
Start: 2024-10-12 | End: 2024-10-11

## 2024-10-11 RX ORDER — TAMSULOSIN HYDROCHLORIDE 0.4 MG/1
0.4 CAPSULE ORAL DAILY
Status: DISCONTINUED | OUTPATIENT
Start: 2024-10-12 | End: 2024-10-11

## 2024-10-11 RX ORDER — LEVOTHYROXINE SODIUM 88 UG/1
88 TABLET ORAL
Status: DISCONTINUED | OUTPATIENT
Start: 2024-10-11 | End: 2024-10-16

## 2024-10-11 RX ORDER — ALBUTEROL SULFATE 90 UG/1
1 INHALANT RESPIRATORY (INHALATION) EVERY 6 HOURS PRN
Status: DISCONTINUED | OUTPATIENT
Start: 2024-10-11 | End: 2024-10-16

## 2024-10-11 RX ORDER — FLUTICASONE PROPIONATE AND SALMETEROL 250; 50 UG/1; UG/1
1 POWDER RESPIRATORY (INHALATION) 2 TIMES DAILY
Status: DISCONTINUED | OUTPATIENT
Start: 2024-10-11 | End: 2024-10-16

## 2024-10-11 RX ORDER — ACETAMINOPHEN 500 MG
500 TABLET ORAL EVERY 6 HOURS PRN
Status: DISCONTINUED | OUTPATIENT
Start: 2024-10-11 | End: 2024-10-16

## 2024-10-11 RX ORDER — MONTELUKAST SODIUM 10 MG/1
10 TABLET ORAL NIGHTLY
Status: DISCONTINUED | OUTPATIENT
Start: 2024-10-11 | End: 2024-10-11

## 2024-10-11 RX ORDER — LISINOPRIL 5 MG/1
5 TABLET ORAL 2 TIMES DAILY
Status: DISCONTINUED | OUTPATIENT
Start: 2024-10-11 | End: 2024-10-12

## 2024-10-11 RX ORDER — ASPIRIN 81 MG/1
81 TABLET, CHEWABLE ORAL DAILY
Status: DISCONTINUED | OUTPATIENT
Start: 2024-10-12 | End: 2024-10-11

## 2024-10-11 RX ORDER — ALBUTEROL SULFATE 0.83 MG/ML
2.5 SOLUTION RESPIRATORY (INHALATION) EVERY 6 HOURS PRN
Status: DISCONTINUED | OUTPATIENT
Start: 2024-10-11 | End: 2024-10-16

## 2024-10-11 RX ORDER — ACETAMINOPHEN 500 MG
1000 TABLET ORAL ONCE
Status: DISCONTINUED | OUTPATIENT
Start: 2024-10-11 | End: 2024-10-11

## 2024-10-11 RX ORDER — PANTOPRAZOLE SODIUM 40 MG/1
40 TABLET, DELAYED RELEASE ORAL
Status: DISCONTINUED | OUTPATIENT
Start: 2024-10-11 | End: 2024-10-16

## 2024-10-11 RX ORDER — ASPIRIN 325 MG
325 TABLET ORAL DAILY
Status: DISCONTINUED | OUTPATIENT
Start: 2024-10-12 | End: 2024-10-16

## 2024-10-11 RX ORDER — ATORVASTATIN CALCIUM 20 MG/1
20 TABLET, FILM COATED ORAL DAILY
Status: DISCONTINUED | OUTPATIENT
Start: 2024-10-12 | End: 2024-10-11

## 2024-10-11 RX ORDER — TAMSULOSIN HYDROCHLORIDE 0.4 MG/1
0.4 CAPSULE ORAL DAILY
Status: DISCONTINUED | OUTPATIENT
Start: 2024-10-11 | End: 2024-10-16

## 2024-10-11 RX ORDER — LEVOTHYROXINE SODIUM 88 UG/1
88 TABLET ORAL
Status: DISCONTINUED | OUTPATIENT
Start: 2024-10-12 | End: 2024-10-11

## 2024-10-11 RX ORDER — ASPIRIN 81 MG/1
81 TABLET, CHEWABLE ORAL DAILY
Status: DISCONTINUED | OUTPATIENT
Start: 2024-10-11 | End: 2024-10-11

## 2024-10-11 NOTE — CM/SW NOTE
CM attempted to complete initial admission screening. Pt medically unable, call placed to son Ayla, no answer, generic  msg left.    CM noted a POLST on file with [facility]  indicated as code status. Current status in EPIC is FULL. CM sent secure msg to Dr Swann and RN alerting to above.    Plan  Pending progress    / to remain available for support and/or discharge planning.     Karine Montenegro, KARMA    Ext [phone]

## 2024-10-11 NOTE — PROGRESS NOTES
[facility]  part of [facility]    Progress Note    Ayla Velez Patient Status:  Inpatient    1923 MRN [id number]   Location [facility] 4W/SW/SE Attending Ayla Swann MD   Hosp Day # 0 PCP AYLA SWANN MD, MD     Subjective:     Constitutional:  Positive for fever.   HENT:  Negative for ear pain and sinus pressure.    Respiratory:  Negative for choking and shortness of breath.    Cardiovascular:  Negative for leg swelling.   Gastrointestinal:  Negative for diarrhea.   Genitourinary:  Negative for flank pain.   Neurological:  Positive for light-headedness.       Objective:   Blood pressure 102/70, pulse 77, temperature 98.3 °F (36.8 °C), temperature source Tympanic, resp. rate 16, weight 107 lb 14.4 oz (48.9 kg), SpO2 96%.  Physical Exam  Vitals reviewed. Exam conducted with a chaperone present.   Constitutional:       General: He is not in acute distress.     Appearance: He is normal weight.   HENT:      Head: Normocephalic.   Eyes:      Extraocular Movements: Extraocular movements intact.   Cardiovascular:      Rate and Rhythm: Normal rate.      Heart sounds: No murmur heard.  Pulmonary:      Breath sounds: No wheezing.   Abdominal:      General: There is no distension.   Musculoskeletal:         General: Normal range of motion.      Cervical back: Neck supple.   Neurological:      General: No focal deficit present.      Mental Status: He is alert and oriented to person, place, and time.         Results:     Lab Results   Component Value Date    WBC 14.7 (H) 10/11/2024    HGB 10.8 (L) 10/11/2024    HCT 33.2 (L) 10/11/2024    .0 10/11/2024    CREATSERUM 1.32 (H) 10/11/2024    BUN 37 (H) 10/11/2024     10/11/2024    K 5.4 (H) 10/11/2024     10/11/2024    CO2 29.0 10/11/2024     (H) 10/11/2024    CA 9.0 10/11/2024    ALB 3.6 2024    ALKPHO 104 2024    BILT 0.5 2024    TP 6.5 2024    AST 20 2024    ALT 14  02/09/2024    .0 (H) 12/09/2023    INR 1.00 12/09/2023    T4F 1.1 02/08/2024    TSH 3.219 02/08/2024    MG 1.8 11/20/2023       XR CHEST AP PORTABLE  (CPT=71045)    Result Date: 10/11/2024  CONCLUSION:   Mild linear appearing bibasilar opacity likely scarring atelectasis.  Infection thought to be less likely.  A preliminary report was issued by the [facility] teleradiology service. There are no major discrepancies.    Dictated by (CST): Gabe Bruce MD on 10/11/2024 at 10:22 AM     Finalized by (CST): Gabe Bruce MD on 10/11/2024 at 10:23 AM          CT BRAIN OR HEAD (CPT=70450)    Result Date: 10/11/2024  CONCLUSION:   No acute intracranial abnormality.  Chronic microvascular white matter ischemia.  Left basal ganglia lacunar infarct.  A preliminary report was issued by the [facility] teleradiology service. There are no major discrepancies.    Dictated by (CST): Gabe Bruce MD on 10/11/2024 at 8:52 AM     Finalized by (CST): Gabe Bruce MD on 10/11/2024 at 8:55 AM         EKG    Result Date: 10/11/2024  Sinus rhythm with 1st degree A-V block Low voltage QRS, consider pulmonary disease, pericardial effusion, or normal variant Right bundle branch block Septal infarct (cited on or before 16-NOV-2023) Abnormal ECG When compared with ECG of 07-FEB-2024 15:53, Vent. rate has increased BY  40 BPM Questionable change in initial forces of Septal leads Confirmed by REMIGIO PARADA ([id number]) on 10/11/2024 7:09:24 AM     Assessment and Plan:   Principal Problem:    Fever, unspecified fever cause  Active Problems:    Community acquired pneumonia of right lower lobe of lung    Leukocytosis    Azotemia    Hyperkalemia    Hyperglycemia  Pyuria.      PLAN  Iv zosyn.  Blood culture.  repeatCXR today.  Dw son.  Labs in am.  Hold lisinopril,  Scd both legs.      CAROLYN OROURKE MD, MD  10/11/2024

## 2024-10-11 NOTE — SLP NOTE
ADULT SWALLOWING EVALUATION    ASSESSMENT    ASSESSMENT/OVERALL IMPRESSION:  PPE REQUIRED. THIS THERAPIST WORE GLOVES FOR DURATION OF EVALUATION. HANDS WASHED UPON ENTRANCE/EXIT.    Per ED MD note, \"Patient Brought by son.  Patient reportedly has been weaker than usual for the past 24 hours confused reportedly called one of the sons multiple times but does not recall this.  No focal deficits just generalized weakness.  No documented fever at home.  Warm to touch on arrival, 101 fever here in the department.  Family also notes he was breathing a little faster this afternoon as well.  No cough.  No abdominal pain no chest pain.  No rash.\"    SLP BSSE orders received and acknowledged. A swallow evaluation warranted per modified diet consistency. Pt afebrile with clear vocal quality, on room air, with oxygen saturation at 93%. Pt with hx of dysphagia at [facility], BSE 2/8/24 with recommendations for puree/moderately thick liquids.   Pt positioned 90 degrees in bed, alert/cooperative/family present. Pt with no complaints of pain. Oral motor examination revealed reduced strength, ROM, and rate of motion. Pt presented with trials of puree and thin liquids via straw. Pt with adequate oral acceptance and bilabial seal across all trials. Pt with reduced bolus formation/propulsion. Pt's swallow response appears delayed with reduced hyolaryngeal elevation/excursion. No clinical signs of aspiration (e.g., immediate/delayed throat clear, immediate/delayed cough, wet vocal quality, increased O2 effort) observed across all trials. 10/11 CXR indicates \"Mild linear appearing bibasilar opacity likely scarring atelectasis.  Infection thought to be less likely\". Oxygen status remained stable t/o the entire evaluation.     At this time, pt presents with mild oral dysphagia and probable pharyngeal dysfunction. Recommend a puree diet and thin liquids with strict adherence to safe swallowing compensatory strategies. Results and recommendations  reviewed with RN, pt, and family. Pt/pt's family v/u to all results/recommendations.     PLAN: SLP to f/u x1-2 meal assessments, monitor imaging, and VFSS if clinically indicated       RECOMMENDATIONS   Diet Recommendations - Solids: Puree  Diet Recommendations - Liquids: Thin Liquids                    Compensatory Strategies Recommended: No straws;Slow rate;Small bites and sips  Aspiration Precautions: Upright position;Slow rate;Small bites and sips;No straw  Medication Administration Recommendations:  (as tolerated)  Treatment Plan/Recommendations: Aspiration precautions    HISTORY   MEDICAL HISTORY  Reason for Referral: Altered diet consistency    Problem List  Principal Problem:    Fever, unspecified fever cause  Active Problems:    Community acquired pneumonia of right lower lobe of lung    Leukocytosis    Azotemia    Hyperkalemia    Hyperglycemia      Past Medical History  Past Medical History:    Asthma (HCC)    Cancer (HCC)    Essential hypertension       Prior Living Situation: Home with support  Diet Prior to Admission: Puree;Thin liquids  Precautions: None    Patient/Family Goals: did not state    SWALLOWING HISTORY  Current Diet Consistency: Puree;Thin liquids  Dysphagia History: see above  Imaging Results: 10/11 CT BRAIN  CONCLUSION:   No acute intracranial abnormality.   Chronic microvascular white matter ischemia.  Left basal ganglia lacunar infarct.     SUBJECTIVE       OBJECTIVE   ORAL MOTOR EXAMINATION     Symmetry: Unable to assess  Strength:  (reduced)     Range of Motion:  (reduced)  Rate of Motion: Reduced    Voice Quality: Clear  Respiratory Status: Unlabored  Consistencies Trialed: Thin liquids;Puree  Method of Presentation: Staff/Clinician assistance;Spoon;Straw  Patient Positioning: Upright;Midline    Oral Phase of Swallow: Impaired  Bolus Retrieval: Intact  Bilabial Seal: Intact  Bolus Formation: Impaired  Bolus Propulsion: Impaired     Retention: Impaired    Pharyngeal Phase of Swallow:  Impaired  Laryngeal Elevation Timing: Appears impaired  Laryngeal Elevation Strength: Appears impaired  Laryngeal Elevation Coordination: Appears impaired  (Please note: Silent aspiration cannot be evaluated clinically. Videofluoroscopic Swallow Study is required to rule-out silent aspiration.)    Esophageal Phase of Swallow: No complaints consistent with possible esophageal involvement  Comments: NA          GOALS  Goal #1 The patient will tolerate puree consistency and thin liquids without overt signs or symptoms of aspiration with 100 % accuracy over 1-2 session(s).  In Progress   Goal #2 The patient/family/caregiver will demonstrate understanding and implementation of aspiration precautions and swallow strategies independently over 1-2 session(s).    In Progress   Goal #3 The patient will utilize compensatory strategies as outlined by  BSSE (clinical evaluation) including Slow rate, Small bites, Small sips, No straws, Upright 90 degrees, Eliminate distractions, Supervision with meals with PRN assistance 100 % of the time across 1-2 sessions.  In Progress     FOLLOW UP  Treatment Plan/Recommendations: Aspiration precautions  Number of Visits to Meet Established Goals:  (1-2)  Follow Up Needed (Documentation Required): Yes  SLP Follow-up Date: 10/12/24    Thank you for your referral.   If you have any questions, please contact MYLES Deras M.S. CCC-SLP  Speech Language Pathologist  Phone Number [phone]

## 2024-10-11 NOTE — ED QUICK NOTES
Orders for admission, patient is aware of plan and ready to go upstairs. Any questions, please call ED KARMA Fuller at extension [phone].     Patient Covid vaccination status: Fully vaccinated     COVID Test Ordered in ED: SARS-CoV-2/Flu A and B/RSV by PCR (GeneXpert)    COVID Suspicion at Admission: N/A    Running Infusions:      Mental Status/LOC at time of transport: A&Ox2-3    Other pertinent information: Fall risk   CIWA score: N/A   NIH score:  N/A

## 2024-10-11 NOTE — ED INITIAL ASSESSMENT (HPI)
Pts son states pt has been less responsive since yesterday morning.  Per translation pt aox3, denies any complaints.  EMS noted coarse wheezing initially, given albuterol and report pt appears improved since presenting on scene.   Respirations nonlabored and patient following commands.

## 2024-10-11 NOTE — ED PROVIDER NOTES
Patient Seen in: [facility] Emergency Department    History     Chief Complaint   Patient presents with    Altered Mental Status     Stated Complaint: ams    HPI    Patient Brought by son.  Patient reportedly has been weaker than usual for the past 24 hours confused reportedly called one of the sons multiple times but does not recall this.  No focal deficits just generalized weakness.  No documented fever at home.  Warm to touch on arrival, 101 fever here in the department.  Family also notes he was breathing a little faster this afternoon as well.  No cough.  No abdominal pain no chest pain.  No rash.  Alleviating factors: none  Exacerbating factors: none    Past Medical History:    Asthma (HCC)    Cancer (HCC)    Essential hypertension       History reviewed. No pertinent surgical history.         History reviewed. No pertinent family history.    Social History     Socioeconomic History    Marital status: Single   Tobacco Use    Smoking status: Never    Smokeless tobacco: Never   Substance and Sexual Activity    Alcohol use: Never     Social Drivers of Health     Food Insecurity: No Food Insecurity (2/7/2024)    Food Insecurity     Food Insecurity: Never true   Transportation Needs: No Transportation Needs (2/7/2024)    Transportation Needs     Lack of Transportation: No    [facility]    Social Connections   Housing Stability: Low Risk  (2/7/2024)    Housing Stability     Housing Instability: No       Review of Systems    Positive for stated complaint: ams  Other systems are as noted in HPI.  Constitutional and vital signs reviewed.      All other systems reviewed and negative except as noted above.    PSFH elements reviewed from today and agreed except as otherwise stated in HPI.    Physical Exam     ED Triage Vitals [10/11/24 0037]   BP (!) 163/83   Pulse 99   Resp 24   Temp 98.8 °F (37.1 °C)   Temp src Temporal   SpO2 94 %   O2 Device  None (Room air)       Current:/59   Pulse 93   Temp (!) 101.2 °F (38.4 °C) (Rectal)   Resp 19   Wt 49.2 kg   SpO2 99%   BMI 21.18 kg/m²    PULSE OX nl  GENERAL: awake alert no sig distress warm to touch  HEAD: normocephalic, atraumatic,   EYES: PERRLA, EOMI, conj sclera clear  THROAT: mmm, no lesions  NECK: supple, no meningeal signs  LUNGS: no resp distress, cta bilateral  CARDIO: RRR without murmur  GI: abdomen is soft and non tender, no masses, nl bowel sounds   EXTREMITIES: from, 5/5 strength in all 4 ext, no edema  NEURO: alert and oiented *3, 2-12 intact, no focal deficit noted  SKIN: good skin turgor, no  rashes  PSYCH: calm, cooperative,    Differential includes:fever, weakness and confusion, consider viral syndrome vs. Pneumonia vs. Uti vs. bacteremia    ED Course     Labs Reviewed   CBC WITH DIFFERENTIAL WITH PLATELET - Abnormal; Notable for the following components:       Result Value    WBC 14.7 (*)     RBC 3.68 (*)     HGB 10.8 (*)     HCT 33.2 (*)     Neutrophil Absolute Prelim 10.92 (*)     Neutrophil Absolute 10.92 (*)     Monocyte Absolute 1.31 (*)     All other components within normal limits   BASIC METABOLIC PANEL (8) - Abnormal; Notable for the following components:    Glucose 129 (*)     Potassium 5.4 (*)     BUN 37 (*)     Creatinine 1.32 (*)     BUN/CREA Ratio 28.0 (*)     eGFR-Cr 48 (*)     All other components within normal limits   URINALYSIS WITH CULTURE REFLEX - Abnormal; Notable for the following components:    Blood Urine 3+ (*)     Protein Urine Trace (*)     WBC Urine 6-10 (*)     RBC Urine >10 (*)     Bacteria Urine Rare (*)     Squamous Epi. Cells Few (*)     All other components within normal limits   TROPONIN I HIGH SENSITIVITY - Normal   LACTIC ACID, PLASMA - Normal   SARS-COV-2/FLU A AND B/RSV BY PCR (GENEXPERT) - Normal    Narrative:     This test is intended for the qualitative detection and differentiation of SARS-CoV-2, influenza A, influenza B, and respiratory  syncytial virus (RSV) viral RNA in nasopharyngeal or nares swabs from individuals suspected of respiratory viral infection consistent with COVID-19 by their healthcare provider. Signs and symptoms of respiratory viral infection due to SARS-CoV-2, influenza, and RSV can be similar.    Test performed using the Xpert Xpress SARS-CoV-2/FLU/RSV (real time RT-PCR)  assay on the GeneXpert instrument, [employer], [place] [address].   This test is being used under the Food and Drug Administration's Emergency Use Authorization.    The authorized Fact Sheet for Healthcare Providers for this assay is available upon request from the laboratory.   RAINBOW DRAW LAVENDER   RAINBOW DRAW LIGHT GREEN   RAINBOW DRAW BLUE   RAINBOW DRAW GOLD   BLOOD CULTURE   BLOOD CULTURE     EKG    Rate, intervals and axes as noted on EKG Report.  Rate: 97  Rhythm: Sinus Rhythm  Reading: nsr with rbbb           MDM       Cardiac Monitor:   Pulse Readings from Last 1 Encounters:   10/11/24 93   , sinus, 88  interpreted by me.    Radiology findings:     I reviewed xray noted basilar infiltrate  I reviewed CT and noted no intracranial bleeding        Medical Decision Making  Problems Addressed:  Community acquired pneumonia of right lower lobe of lung: acute illness or injury  Fever, unspecified fever cause: acute illness or injury    Amount and/or Complexity of Data Reviewed  Labs: ordered. Decision-making details documented in ED Course.  Radiology: ordered and independent interpretation performed. Decision-making details documented in ED Course.  ECG/medicine tests: ordered and independent interpretation performed. Decision-making details documented in ED Course.  Discussion of management or test interpretation with external provider(s): Spoke with Dr. Swann accepts admission.  Iv abx given, lactic nl, bp stable.      Risk  Decision regarding hospitalization.        [facility]  part of [facility]      Sepsis Reassessment Note    BP  110/59   Pulse 93   Temp (!) 101.2 °F (38.4 °C) (Rectal)   Resp 19   Wt 49.2 kg   SpO2 99%   BMI 21.18 kg/m²      I completed the sepsis reassessment at 03:15     Cardiac:  Regularity: Regular  Rate: Normal  Heart Sounds: S1,S2    Lungs:   Right: Diminished  Left: Diminished    Peripheral Pulses:  Radial: Right 1+ or Left 1+      Capillary Refill:  <3 Secs    Skin:  Temp/Moisture: Warm and Dry  Color: Normal      Sanchez Hester MD  10/11/2024  3:15 AM        Disposition and Plan     Clinical Impression:  1. Fever, unspecified fever cause    2. Community acquired pneumonia of right lower lobe of lung        Disposition:  Admit    Follow-up:  No follow-up provider specified.    Medications Prescribed:  Current Discharge Medication List          Hospital Problems       Present on Admission  Date Reviewed: 8/22/2023            ICD-10-CM Noted POA    * (Principal) Fever, unspecified fever cause R50.9 10/11/2024 Unknown    Azotemia R79.89 10/11/2024 Yes    Hyperglycemia R73.9 10/11/2024 Yes    Hyperkalemia E87.5 10/11/2024 Yes    Leukocytosis D72.829 10/11/2024 Yes

## 2024-10-11 NOTE — PLAN OF CARE
Ayla is from home with his son. Speaks English. Alert to self. At baseline uses rolling walker. Admitted overnoc due to family noting patient having more difficulty ambulating/lethargy/mental status changes. Febrile on admission. Cxr and ct head completed. Cxr(+) pneumonia possible aspiration. BSSE completed today-rec current home consistency pureed. Pt/ot eval completed-see notes-ua resulted-pyuria, on RA/primofit, iv rocephin-afebrile, denies pain, family at bedside, bc pending results. Poc tbd  Problem: Patient Centered Care  Goal: Patient preferences are identified and integrated in the patient's plan of care  Description: Interventions:  - What would you like us to know as we care for you? Speak English  - Provide timely, complete, and accurate information to patient/family  - Incorporate patient and family knowledge, values, beliefs, and cultural backgrounds into the planning and delivery of care  - Encourage patient/family to participate in care and decision-making at the level they choose  - Honor patient and family perspectives and choices  Outcome: Progressing     Problem: PAIN - ADULT  Goal: Verbalizes/displays adequate comfort level or patient's stated pain goal  Description: INTERVENTIONS:  - Encourage pt to monitor pain and request assistance  - Assess pain using appropriate pain scale  - Administer analgesics based on type and severity of pain and evaluate response  - Implement non-pharmacological measures as appropriate and evaluate response  - Consider cultural and social influences on pain and pain management  - Manage/alleviate anxiety  - Utilize distraction and/or relaxation techniques  - Monitor for opioid side effects  - Notify MD/LIP if interventions unsuccessful or patient reports new pain  - Anticipate increased pain with activity and pre-medicate as appropriate  Outcome: Progressing     Problem: RISK FOR INFECTION - ADULT  Goal: Absence of fever/infection during anticipated neutropenic  period  Description: INTERVENTIONS  - Monitor WBC  - Administer growth factors as ordered  - Implement neutropenic guidelines  Outcome: Progressing     Problem: SAFETY ADULT - FALL  Goal: Free from fall injury  Description: INTERVENTIONS:  - Assess pt frequently for physical needs  - Identify cognitive and physical deficits and behaviors that affect risk of falls.  - Brandon fall precautions as indicated by assessment.  - Educate pt/family on patient safety including physical limitations  - Instruct pt to call for assistance with activity based on assessment  - Modify environment to reduce risk of injury  - Provide assistive devices as appropriate  - Consider OT/PT consult to assist with strengthening/mobility  - Encourage toileting schedule  Outcome: Progressing     Problem: DISCHARGE PLANNING  Goal: Discharge to home or other facility with appropriate resources  Description: INTERVENTIONS:  - Identify barriers to discharge w/pt and caregiver  - Include patient/family/discharge partner in discharge planning  - Arrange for needed discharge resources and transportation as appropriate  - Identify discharge learning needs (meds, wound care, etc)  - Arrange for interpreters to assist at discharge as needed  - Consider post-discharge preferences of patient/family/discharge partner  - Complete POLST form as appropriate  - Assess patient's ability to be responsible for managing their own health  - Refer to Case Management Department for coordinating discharge planning if the patient needs post-hospital services based on physician/LIP order or complex needs related to functional status, cognitive ability or social support system  Outcome: Progressing     Problem: Altered Communication/Language Barrier  Goal: Patient/Family is able to understand and participate in their care  Description: Interventions:  - Assess communication ability and preferred communication style  - Implement communication aides and strategies  - Use  visual cues when possible  - Listen attentively, be patient, do not interrupt  - Minimize distractions  - Allow time for understanding and response  - Establish method for patient to ask for assistance (call light)  - Provide an  as needed  - Communicate barriers and strategies to overcome with those who interact with patient  Outcome: Progressing

## 2024-10-11 NOTE — H&P
[facility]    PATIENT'S NAME: AYLA CASTANO   ATTENDING PHYSICIAN: Ayla Swann MD   PATIENT ACCOUNT#:   [id number]    LOCATION:  [address] [place] [address]  MEDICAL RECORD #:   [id number]       YOB: 1923  ADMISSION DATE:       10/11/2024    HISTORY AND PHYSICAL EXAMINATION    CHIEF COMPLAINT:  This is a 89-year-old male with a history of hypertension, who presented to the emergency room with profound weakness, fever, and lethargy.     HISTORY OF PRESENT ILLNESS:  Information is obtained from the patient as well as the son who is at the bedside and stated that on the day of admission, afternoon, patient was feeling slightly weak but he had his dinner, and after that he was somewhat restless and the son went to help him to stand up, he could not, and patient was feverish.  Then, he called the paramedics and patient was then brought to emergency room at [facility].  In the ER, patient had extensive workup done including CT scan of the brain and chest x-ray and all the blood tests, and his white blood cell count was elevated.  The patient then had blood cultures done x2, and his respiratory panel including COVID-19 was negative.  Patient was then given Rocephin 3.375 mg IV piggyback and admitted to the medical floor for further evaluation.  The patient never had such an episode in the past.    PAST MEDICAL HISTORY:  Significant for hypertension.  He also has physical therapy at home by home visiting staff.      MEDICATIONS:  As per the admitting reconciliation list.     ALLERGIES:  Not known.    FAMILY HISTORY:  Noncontributory.    SOCIAL HISTORY:  Patient lives at his son's home and there is very good supervision and assistance.  Patient never smoked cigarettes or drank alcohol.  No history of illicit drug use.     REVIEW OF SYSTEMS:  Son stated that patient had no headache, loss of consciousness, chest pain.  No vomiting, diarrhea, contact with any illnesses.         PHYSICAL EXAMINATION:    GENERAL:  Patient is lying in the bed.  He is a slender-built male, is very comfortable, and responded to the questions correctly.   VITAL SIGNS:  Blood pressure 104/72, pulse 84 per minute, and regular respirations at 16 to 18, and he is afebrile.   HEENT:  Unremarkable.  NECK:  Supple, without any thyroid enlargement.   LUNGS:  Mild expiratory rhonchi.  HEART:  Normal first and second heart sounds without any murmur.     ABDOMEN:  Soft without any tenderness.  There is no suprapubic or severe tenderness.  EXTREMITIES:  There is no calf tenderness or edema or tremors, and SCDs are on both legs.  NEUROLOGIC:  Intact.  The patient moves all extremities.  This is his baseline.    ASSESSMENT:    1.   Pneumonia with fever and leukocytosis.  2.   Mild hyperkalemia.  3.   Pyuria.     4.   Hypertension, now stable.     PLAN:    1.   Continue IV Zosyn 3.375 mg q.8 h.  2.   Blood cultures already done in the emergency room.  3.   Repeat chest x-ray today.   4.   I had a long discussion with son, and all his questions were answered to his satisfaction.  He verbalized good understanding.  Labs are ordered for the morning.    5.   Hold lisinopril since potassium is high.  6.   Continue SCDs on both legs for 24 hours.     Dictated By Ayla Swann MD  d: 10/11/2024 11:43:46  t: 10/11/2024 12:35:48  Job [id number]  [facility]/

## 2024-10-11 NOTE — PHYSICAL THERAPY NOTE
PHYSICAL THERAPY EVALUATION - INPATIENT     Room Number: 404/404-A  Evaluation Date: 10/11/2024  Type of Evaluation: Initial   Physician Order: PT Eval and Treat (functional mobility screen)    Presenting Problem: PNA  Co-Morbidities : HTN, asthma, cancer  Reason for Therapy: Mobility Dysfunction and Discharge Planning    PHYSICAL THERAPY ASSESSMENT   Patient is a 89 year old male admitted 10/11/2024 for pneumonia.  Prior to admission, patient's baseline is supervision level using a rolling walker, assist with dressing and bathing.  Patient is currently functioning below baseline with bed mobility, transfers, gait, and stair negotiation.  Patient is requiring moderate assist and maximum assist as a result of the following impairments: decreased functional strength, impaired standing balance, impaired coordination, decreased muscular endurance, cognitive deficits (confusion, impaired command following, impaired problem solving), and medical status.  Physical Therapy will continue to follow for duration of hospitalization.    Patient will benefit from continued skilled PT Services to promote return to prior level of function and safety with continuous assistance and gradual rehabilitative therapy .    PLAN DURING HOSPITALIZATION  Nursing Mobility Recommendation : Lift Equipment  PT Device Recommendation: Rolling walker;Mechanical lift;Wheelchair  PT Treatment Plan: Bed mobility;Body mechanics;Endurance;Energy conservation;Patient education;Family education;Gait training;Strengthening;Stair training;Transfer training;Balance training  Rehab Potential : Fair  Frequency (Obs): 3-5x/week     PHYSICAL THERAPY MEDICAL/SOCIAL HISTORY     Problem List  Principal Problem:    Fever, unspecified fever cause  Active Problems:    Community acquired pneumonia of right lower lobe of lung    Leukocytosis    Azotemia    Hyperkalemia    Hyperglycemia      HOME SITUATION  Type of Home: House  Home Layout: Two level;Able to live on main  level (stays on second level)  Stairs to Enter : 15   Railing: Yes              Lives With: Family    Drives: No   Patient Regularly Uses: Rolling walker     Prior Level of Haakon: supervision using a rolling walker, independent with toileting, assist with dressing and bathing, assist with stairs    SUBJECTIVE  Pt requesting to stay in bed.    PHYSICAL THERAPY EXAMINATION   OBJECTIVE  Precautions:  needed (Korean)  Fall Risk: High fall risk    WEIGHT BEARING RESTRICTION  none    PAIN ASSESSMENT  Rating: Unable to rate  Location: R shoulder  Management Techniques: Body mechanics;Repositioning    COGNITION  Overall Cognitive Status:  Impaired and family reporting below baseline  Arousal/Alertness:  lethargic    RANGE OF MOTION AND STRENGTH ASSESSMENT  Upper extremity ROM and strength are within functional limits  BUEs to shoulder height, R shoulder painful  Lower extremity ROM is within functional limits  BLEs  Lower extremity strength is limited 2/2 deconditioning and fatigue, unable to perform full SLR bilaterally    BALANCE  Static Sitting: Fair +  Dynamic Sitting: Fair -  Static Standing: Poor -  Dynamic Standing: Dependent    ACTIVITY TOLERANCE  Pulse: 77  Heart Rate Source: Monitor     BP: 91/57 (pre, 145/68 post)  BP Location: Right arm  BP Method: Automatic  Patient Position: Semi-Silva    O2 WALK  Oxygen Therapy  SPO2% on Room Air at Rest: 94  SPO2% Ambulation on Room Air: 94    AM-PAC '6-Clicks' INPATIENT SHORT FORM - BASIC MOBILITY  How much difficulty does the patient currently have...  Patient Difficulty: Turning over in bed (including adjusting bedclothes, sheets and blankets)?: A Lot   Patient Difficulty: Sitting down on and standing up from a chair with arms (e.g., wheelchair, bedside commode, etc.): A Lot   Patient Difficulty: Moving from lying on back to sitting on the side of the bed?: A Lot   How much help from another person does the patient currently need...   Help from Another:  Moving to and from a bed to a chair (including a wheelchair)?: A Lot   Help from Another: Need to walk in hospital room?: Total   Help from Another: Climbing 3-5 steps with a railing?: Total     AM-PAC Score:  Raw Score: 10   Approx Degree of Impairment: 76.75%   Standardized Score (AM-PAC Scale): 32.29   CMS Modifier (G-Code): CL    FUNCTIONAL ABILITY STATUS  Functional Mobility/Gait Assessment  Gait Assistance: Not tested (deferred 2/2 impaired standing balance)  Supine to Sit: moderate assist  Sit to Supine: minimal assist  Sit to Stand: maximum assist - from edge of bed, posterior lean requiring maxA to maintain static stand, BLE knee varus, tolerating standing ~10s    Exercise/Education Provided:  Education Provided To: Family/Caregiver;Patient (granddaughter)  Patient Education: Role of Physical Therapy;Plan of Care;Discharge Recommendations;Functional Transfer Techniques;Fall Prevention;Proper Body Mechanics  Patient's Response to Education: Requires Further Education;Demonstrates Poor Carry Over to Information;Does Not Demonstrate Skills Needed for Learning        Skilled Therapy Provided: Pt lethargic, delayed responses requiring increased repetition and encouragement to participate. Pt's granddaughter present to translate Sami. Pt functioning below baseline level, demonstrates inadequate standing balance/tolerance to initiation gait training at this time.     Patient seen for evaluation in coordination with occupational therapy to maximize patient function and enhance communication with patient given lethargy and language barrier.  Patient received semi-fowlers in bed, agreeable to physical therapy evaluation with encouragement. Vital signs monitored as noted above, no adverse symptoms and patient stable during session. Next session anticipate to progress bed mobility, transfers, and gait.    Patient history and/or personal factors that may impact the plan of care include home accessibility concerns,  cognitive deficits, and co-morbidities (HTN, asthma, cancer) affecting activity tolerance, endurance . Based on the physical therapy examination of the noted systems and functional activity/participation limitations, the patient presentation is evolving given the patient demonstrates worsening of previously stable condition and demonstrates cognitive skills affecting safety.    The patient's Approx Degree of Impairment: 76.75% has been calculated based on documentation in the [facility] '6 clicks' Inpatient Basic Mobility Short Form.  Research supports that patients with this level of impairment may benefit from long-term care, however based on patient's PLOF and social support anticipate would benefit from a rehab facility to maximize functional independence and minimize caregiver burden.  Final disposition will be made by interdisciplinary medical team.    Patient End of Session: In bed;Needs met;Call light within reach;RN aware of session/findings;All patient questions and concerns addressed;Hospital anti-slip socks;Alarm set;Family present    CURRENT GOALS  Goals to be met by: 11/11/24  Patient Goal Patient's self-stated goal is: return to PLOF   Goal #1 Patient is able to demonstrate supine - sit EOB @ level: supervision     Goal #1   Current Status    Goal #2 Patient is able to demonstrate transfers EOB to/from C at assistance level: minimum assistance with walker - rolling     Goal #2  Current Status    Goal #3 Patient is able to ambulate 50 feet with assist device: walker - rolling at assistance level: minimum assistance   Goal #3   Current Status    Goal #4 Patient will negotiate 8 stairs/one curb w/ assistive device and minimum assistance    Goal #4   Current Status    Goal #5 Patient to demonstrate independence with home activity/exercise instructions provided to patient in preparation for discharge.   Goal #5   Current Status    Goal #6    Goal #6  Current Status      Patient Evaluation Complexity  Level:  History High - 3 or more personal factors and/or co-morbidities   Examination of body systems High - addressing a total of 4 or more elements   Clinical Presentation  High - Unstable   Clinical Decision Making  High Complexity       Therapeutic Activity:  18 minutes      Elisa Pagan, PT, DPT  [facility]  Rehab Services - Physical Therapy  [id number]

## 2024-10-11 NOTE — OCCUPATIONAL THERAPY NOTE
OCCUPATIONAL THERAPY EVALUATION - INPATIENT     Room Number: 404/404-A  Evaluation Date: 10/11/2024  Type of Evaluation: Initial  Presenting Problem: fever, community acquired PNA of RLL of lung    Physician Order: IP Consult to Occupational Therapy  Reason for Therapy: ADL/IADL Dysfunction and Discharge Planning    OCCUPATIONAL THERAPY ASSESSMENT   Patient is a 89 year old male admitted 10/11/2024 for fever, community acquired PNA of RLL of lung.  Per chart, patient receives assist with ADLs as needed at baseline. MI for fx mobility using RW.  Patient is currently functioning below baseline with ADLs and fx mobility/transfers.  Patient is requiring up to max/total assist for ADLs as a result of the following impairments: decreased functional strength, decreased functional reach, decreased endurance, pain, impaired balance, decreased muscular endurance, cognitive deficits (command following), and decreased safety awareness. Occupational Therapy will continue to follow for duration of hospitalization.    Patient will benefit from continued skilled OT Services to promote return to prior level of function and safety with continuous assistance and gradual rehabilitative therapy.    PLAN DURING HOSPITALIZATION  OT Device Recommendations: TBD  OT Treatment Plan: Balance activities;Energy conservation/work simplification techniques;ADL training;Functional transfer training;Endurance training;Patient/Family education;Patient/Family training;Equipment eval/education;Compensatory technique education     OCCUPATIONAL THERAPY MEDICAL/SOCIAL HISTORY   Problem List  Principal Problem:    Fever, unspecified fever cause  Active Problems:    Community acquired pneumonia of right lower lobe of lung    Leukocytosis    Azotemia    Hyperkalemia    Hyperglycemia    HOME SITUATION  Type of Home: House  Home Layout: Two level; Able to live on main level  Lives With: Family  Toilet and Equipment: Standard height toilet  Shower/Tub and  Equipment: Walk-in shower; Shower chair  Other Equipment: Hospital bed  Drives: No  Patient Regularly Uses: Rolling walker  Stairs in Home: 15 BASSEM with railing    SUBJECTIVE  Patient agreeable to OT evaluation. Patient is Portuguese-speaking; patient's granddaughter interpreting throughout session.    OCCUPATIONAL THERAPY EXAMINATION      OBJECTIVE  Precautions:  needed (Portuguese-speaking)  Fall Risk: High fall risk      PAIN ASSESSMENT  Rating: -- (not rated)  Location: R shoulder  Management Techniques: Body mechanics; Activity promotion; Repositioning      ACTIVITY TOLERANCE  Pulse: 78 (at rest; 101 bpm with ax)  Heart Rate Source: Monitor     BP: 91/57 (145/68 post ax)  BP Location: Right arm  BP Method: Automatic  Patient Position: Semi-Silva    O2 SATURATIONS  Oxygen Therapy  SPO2% on Room Air at Rest: 94  SPO2% Ambulation on Room Air: 94    COGNITION  Following Commands:  follows one step commands with repetition  Safety Judgement:  decreased awareness of need for assistance and decreased awareness of need for safety    SENSATION  Light touch:  intact    RANGE OF MOTION / STRENGTH ASSESSMENT  Upper extremity ROM/strength WFL except R UE d/t     COORDINATION  Gross Motor: WFL   Fine Motor: WFL     ACTIVITIES OF DAILY LIVING ASSESSMENT  AM-PAC ‘6-Clicks’ Inpatient Daily Activity Short Form  How much help from another person does the patient currently need…  -   Putting on and taking off regular lower body clothing?: A Lot  -   Bathing (including washing, rinsing, drying)?: A Lot  -   Toileting, which includes using toilet, bedpan or urinal? : Total  -   Putting on and taking off regular upper body clothing?: A Lot  -   Taking care of personal grooming such as brushing teeth?: A Little  -   Eating meals?: A Little    AM-PAC Score:  Score: 13  Approx Degree of Impairment: 63.03%  Standardized Score (AM-PAC Scale): 32.03  CMS Modifier (G-Code): CL    BED MOBILITY  Supine to Sit: mod assist  Sit to Supine: min  assist  Comments:  Benefited from repeat commands for sequencing bed mobility despite granddaughter translating in native language. Patient's granddaughter reported patient's current level of cognition is not at baseline.    FUNCTIONAL TRANSFER ASSESSMENT  Sit <> Stand from EOB: max assist  Comments:  Patient with B LE heavily resting on EOB upon standing. Unable to shift weight while standing with RW. Demonstrated B LE buckling and unsteadiness.    FUNCTIONAL MOBILITY  Unable to safely progress mobility at this time.    ACTIVITIES OF DAILY LIVING  Eating: setup assist (per obs)   Grooming: min assist seated (per obs)   UB Dressing: min assist (per obs)   LB Dressing: max assist  Toileting: total assist (per obs)     EDUCATION PROVIDED  Patient Education : Role of Occupational Therapy; Plan of Care; Discharge Recommendations; Functional Transfer Techniques; Fall Prevention; Posture/Positioning; Proper Body Mechanics  Patient's Response to Education: Requires Further Education  Family/Caregiver Education : Role of Occupational Therapy; Plan of Care; Discharge Recommendations (granddaughter present)  Family/Caregiver's Response to Education: Verbalized Understanding    The patient's Approx Degree of Impairment: 63.03% has been calculated based on documentation in the [facility] '6 clicks' Inpatient Daily Activity Short Form.  Research supports that patients with this level of impairment may benefit from rehab.  Final disposition will be made by interdisciplinary medical team.     Patient End of Session: In bed;Needs met;Call light within reach;RN aware of session/findings;All patient questions and concerns addressed;Hospital anti-slip socks;Alarm set;Family present    OT Goals  Patients self stated goal is: none stated     Patient will complete functional transfer with Min A   Comment:     Patient will tolerate standing for 2-3 min in prep for adls with Min A  Comment:    Patient will complete oral/facial grooming task in  supported sitting with Setup Assist  Comment:           Goals  on: 10/25/24  Frequency: 3-5x/week    Patient Evaluation Complexity Level:   Occupational Profile/Medical History MODERATE - Expanded review of history including review of medical or therapy record   Specific performance deficits impacting engagement in ADL/IADL HIGH  5+ performance deficits    Client Assessment/Performance Deficits HIGH - Comorbidities and significant modifications of tasks    Clinical Decision Making MODERATE - Analysis of occupational profile, detailed assessments, several treatment options    Overall Complexity MODERATE     OT Session Time  Therapeutic Activity: 15 minutes    IVETTE Bowens/L  [facility]  #[id number]

## 2024-10-12 LAB
ALBUMIN SERPL-MCNC: 3.6 G/DL (ref 3.2–4.8)
ALBUMIN/GLOB SERPL: 1.5 {RATIO} (ref 1–2)
ALP LIVER SERPL-CCNC: 72 U/L
ALT SERPL-CCNC: 10 U/L
ANION GAP SERPL CALC-SCNC: 5 MMOL/L (ref 0–18)
AST SERPL-CCNC: 15 U/L (ref ?–34)
BASOPHILS # BLD AUTO: 0.07 X10(3) UL (ref 0–0.2)
BASOPHILS NFR BLD AUTO: 0.7 %
BILIRUB SERPL-MCNC: 0.5 MG/DL (ref 0.2–0.9)
BUN BLD-MCNC: 27 MG/DL (ref 9–23)
BUN/CREAT SERPL: 25.2 (ref 10–20)
CALCIUM BLD-MCNC: 8.4 MG/DL (ref 8.7–10.4)
CHLORIDE SERPL-SCNC: 107 MMOL/L (ref 98–112)
CO2 SERPL-SCNC: 26 MMOL/L (ref 21–32)
CREAT BLD-MCNC: 1.07 MG/DL
DEPRECATED RDW RBC AUTO: 47.5 FL (ref 35.1–46.3)
EGFRCR SERPLBLD CKD-EPI 2021: 62 ML/MIN/1.73M2 (ref 60–?)
EOSINOPHIL # BLD AUTO: 0.27 X10(3) UL (ref 0–0.7)
EOSINOPHIL NFR BLD AUTO: 2.6 %
ERYTHROCYTE [DISTWIDTH] IN BLOOD BY AUTOMATED COUNT: 13.3 % (ref 11–15)
GLOBULIN PLAS-MCNC: 2.4 G/DL (ref 2–3.5)
GLUCOSE BLD-MCNC: 105 MG/DL (ref 70–99)
HCT VFR BLD AUTO: 30.9 %
HGB BLD-MCNC: 9.5 G/DL
IMM GRANULOCYTES # BLD AUTO: 0.03 X10(3) UL (ref 0–1)
IMM GRANULOCYTES NFR BLD: 0.3 %
LYMPHOCYTES # BLD AUTO: 1.23 X10(3) UL (ref 1–4)
LYMPHOCYTES NFR BLD AUTO: 11.9 %
MCH RBC QN AUTO: 29.4 PG (ref 26–34)
MCHC RBC AUTO-ENTMCNC: 30.7 G/DL (ref 31–37)
MCV RBC AUTO: 95.7 FL
MONOCYTES # BLD AUTO: 0.87 X10(3) UL (ref 0.1–1)
MONOCYTES NFR BLD AUTO: 8.4 %
NEUTROPHILS # BLD AUTO: 7.84 X10 (3) UL (ref 1.5–7.7)
NEUTROPHILS # BLD AUTO: 7.84 X10(3) UL (ref 1.5–7.7)
NEUTROPHILS NFR BLD AUTO: 76.1 %
OSMOLALITY SERPL CALC.SUM OF ELEC: 291 MOSM/KG (ref 275–295)
PLATELET # BLD AUTO: 210 10(3)UL (ref 150–450)
POTASSIUM SERPL-SCNC: 4.5 MMOL/L (ref 3.5–5.1)
PROT SERPL-MCNC: 6 G/DL (ref 5.7–8.2)
RBC # BLD AUTO: 3.23 X10(6)UL
SODIUM SERPL-SCNC: 138 MMOL/L (ref 136–145)
WBC # BLD AUTO: 10.3 X10(3) UL (ref 4–11)

## 2024-10-12 RX ORDER — AMLODIPINE BESYLATE 2.5 MG/1
2.5 TABLET ORAL ONCE
Status: COMPLETED | OUTPATIENT
Start: 2024-10-12 | End: 2024-10-12

## 2024-10-12 RX ORDER — LISINOPRIL 5 MG/1
5 TABLET ORAL DAILY
Status: DISCONTINUED | OUTPATIENT
Start: 2024-10-12 | End: 2024-10-12

## 2024-10-12 RX ORDER — AMLODIPINE BESYLATE 5 MG/1
5 TABLET ORAL DAILY
Status: DISCONTINUED | OUTPATIENT
Start: 2024-10-13 | End: 2024-10-13

## 2024-10-12 NOTE — CM/SW NOTE
Department  notified of request for gail [facility] referrals started. Assigned CM/SW to follow up with pt/family on further discharge planning.      Marija Mcdaniel  DSC

## 2024-10-12 NOTE — CM/SW NOTE
10/12/24 1500   Discharge Needs   Anticipated D/C needs Subacute rehab   Services Requested   [facility] to [facility] Yes   PASRR Level 1 Submitted Yes     Anticipated therapy need: Gradual Rehabilitative Therapy.    [facility] referrals initiated  PASRR completed  [facility] covered by [facility]  If agreeable to [facility], will need list for choice.    Sera Hawthorne MBA BSN RN CRRN   RN Case Manager  [phone]

## 2024-10-12 NOTE — SLP NOTE
SPEECH DAILY NOTE - INPATIENT    ASSESSMENT & PLAN   ASSESSMENT  PPE REQUIRED. THIS THERAPIST WORE GLOVES FOR DURATION OF EVALUATION. HANDS WASHED UPON ENTRANCE/EXIT.    SLP f/u for ongoing dysphagia tx/meal assessment per recommendations of puree and thin liquids per BSE on 10/11. RN reports pt tolerates diet and medication well with no overt clinical s/s aspiration. Pt denies any swallowing challenges.     Pt positioned upright in bed. Pt afebrile, tolerating room air with oxygen status 95% prior to the start of oral trials. SLP reviewed aspiration precautions and safe swallowing compensatory strategies with the patient. Safe swallow guidelines remain written on the white board in purple. Orange swallow guideline remains on the whiteboard in the room. Patient and/or family (son) v/u. Provided clinician assistance, pt tolerates puree solids and thin liquids via small cup sips with no overt clinical signs/symptoms of aspiration. Patient provided 1 trial of thin liquids via straw and presented with immediate coughing and wet vocal quality. Patient's son reported utilizing straws throughout the hospitalization. SLP educated the patient and son regarding no straw recommendation. Patient presented with adequate oral acceptance, adequate bilabial seal, and impaired/prolonged AP transit. Patient's pharyngeal phase appeared impaired with suspected delayed swallow, suspected premature spillage with thin liquids via straw, and suspected reduced hyolaryngeal elevation judged clinically to palpation. Oxygen status remained stable t/o the entire session. SLP to f/u with meal assessment x2, monitor  CXR, and VFSS if any overt CSA and/or decline in CXR. RN alerted with results and recommendations.     MOST RECENT CXR 10/11;  Impression   CONCLUSION:  1. Probable scarring in the mid and lower lungs.  No consolidation.       Diet Recommendations - Solids: Puree  Diet Recommendations - Liquids: Thin Liquids    Compensatory  Strategies Recommended: No straws;Slow rate;Alternate consistencies;Small bites and sips  Aspiration Precautions: Upright position;Slow rate;Small bites and sips;No straw  Medication Administration Recommendations:  (as tolerated)    Patient Experiencing Pain: No                Treatment Plan  Treatment Plan/Recommendations: Aspiration precautions    Interdisciplinary Communication: Discussed with RN            GOALS  Goal #1 The patient will tolerate puree consistency and thin liquids without overt signs or symptoms of aspiration with 100 % accuracy over 1-2 session(s).  In Progress   Goal #2 The patient/family/caregiver will demonstrate understanding and implementation of aspiration precautions and swallow strategies independently over 1-2 session(s).     In Progress   Goal #3 The patient will utilize compensatory strategies as outlined by  BSSE (clinical evaluation) including Slow rate, Small bites, Small sips, No straws, Upright 90 degrees, Eliminate distractions, Supervision with meals with PRN assistance 100 % of the time across 1-2 sessions.  In Progress      FOLLOW UP  Follow Up Needed (Documentation Required): Yes  SLP Follow-up Date: 10/13/24  Number of Visits to Meet Established Goals: 3    Session: 2    If you have any questions, please contact Kylie Mayen SLP

## 2024-10-12 NOTE — PLAN OF CARE
Patient is from home with son, emilee Polanco. Son @ bedside. RA. Takes meds crushed with applesauce. Puree diet ( no straw). Saline locked. Rocephin continued. Voiding via primofit. PRN tylenol. Max assist. Call light within reach, frequent rounding. Safety measures in place. Plan for Rehab when medically clear.         Problem: Patient Centered Care  Goal: Patient preferences are identified and integrated in the patient's plan of care  Description: Interventions:  - What would you like us to know as we care for you?  - Provide timely, complete, and accurate information to patient/family  - Incorporate patient and family knowledge, values, beliefs, and cultural backgrounds into the planning and delivery of care  - Encourage patient/family to participate in care and decision-making at the level they choose  - Honor patient and family perspectives and choices  Outcome: Progressing     Problem: PAIN - ADULT  Goal: Verbalizes/displays adequate comfort level or patient's stated pain goal  Description: INTERVENTIONS:  - Encourage pt to monitor pain and request assistance  - Assess pain using appropriate pain scale  - Administer analgesics based on type and severity of pain and evaluate response  - Implement non-pharmacological measures as appropriate and evaluate response  - Consider cultural and social influences on pain and pain management  - Manage/alleviate anxiety  - Utilize distraction and/or relaxation techniques  - Monitor for opioid side effects  - Notify MD/LIP if interventions unsuccessful or patient reports new pain  - Anticipate increased pain with activity and pre-medicate as appropriate  Outcome: Progressing     Problem: RISK FOR INFECTION - ADULT  Goal: Absence of fever/infection during anticipated neutropenic period  Description: INTERVENTIONS  - Monitor WBC  - Administer growth factors as ordered  - Implement neutropenic guidelines  Outcome: Progressing     Problem: SAFETY ADULT - FALL  Goal: Free from  fall injury  Description: INTERVENTIONS:  - Assess pt frequently for physical needs  - Identify cognitive and physical deficits and behaviors that affect risk of falls.  - [place] fall precautions as indicated by assessment.  - Educate pt/family on patient safety including physical limitations  - Instruct pt to call for assistance with activity based on assessment  - Modify environment to reduce risk of injury  - Provide assistive devices as appropriate  - Consider OT/PT consult to assist with strengthening/mobility  - Encourage toileting schedule  Outcome: Progressing     Problem: DISCHARGE PLANNING  Goal: Discharge to home or other facility with appropriate resources  Description: INTERVENTIONS:  - Identify barriers to discharge w/pt and caregiver  - Include patient/family/discharge partner in discharge planning  - Arrange for needed discharge resources and transportation as appropriate  - Identify discharge learning needs (meds, wound care, etc)  - Arrange for interpreters to assist at discharge as needed  - Consider post-discharge preferences of patient/family/discharge partner  - Complete POLST form as appropriate  - Assess patient's ability to be responsible for managing their own health  - Refer to Case Management Department for coordinating discharge planning if the patient needs post-hospital services based on physician/LIP order or complex needs related to functional status, cognitive ability or social support system  Outcome: Progressing     Problem: Altered Communication/Language Barrier  Goal: Patient/Family is able to understand and participate in their care  Description: Interventions:  - Assess communication ability and preferred communication style  - Implement communication aides and strategies  - Use visual cues when possible  - Listen attentively, be patient, do not interrupt  - Minimize distractions  - Allow time for understanding and response  - Establish method for patient to ask for  assistance (call light)  - Provide an  as needed  - Communicate barriers and strategies to overcome with those who interact with patient  Outcome: Progressing

## 2024-10-12 NOTE — PLAN OF CARE
Patient is alert and oriented times 3. Korean speaking only.Family at bedside to help wit translation. Breathing on room air. Vitals being monitored. SCDs on. Call light within reach. Safety precautions in place.      Problem: PAIN - ADULT  Goal: Verbalizes/displays adequate comfort level or patient's stated pain goal  Description: INTERVENTIONS:  - Encourage pt to monitor pain and request assistance  - Assess pain using appropriate pain scale  - Administer analgesics based on type and severity of pain and evaluate response  - Implement non-pharmacological measures as appropriate and evaluate response  - Consider cultural and social influences on pain and pain management  - Manage/alleviate anxiety  - Utilize distraction and/or relaxation techniques  - Monitor for opioid side effects  - Notify MD/LIP if interventions unsuccessful or patient reports new pain  - Anticipate increased pain with activity and pre-medicate as appropriate  Outcome: Progressing     Problem: SAFETY ADULT - FALL  Goal: Free from fall injury  Description: INTERVENTIONS:  - Assess pt frequently for physical needs  - Identify cognitive and physical deficits and behaviors that affect risk of falls.  - [place] fall precautions as indicated by assessment.  - Educate pt/family on patient safety including physical limitations  - Instruct pt to call for assistance with activity based on assessment  - Modify environment to reduce risk of injury  - Provide assistive devices as appropriate  - Consider OT/PT consult to assist with strengthening/mobility  - Encourage toileting schedule  Outcome: Progressing     Problem: DISCHARGE PLANNING  Goal: Discharge to home or other facility with appropriate resources  Description: INTERVENTIONS:  - Identify barriers to discharge w/pt and caregiver  - Include patient/family/discharge partner in discharge planning  - Arrange for needed discharge resources and transportation as appropriate  - Identify discharge learning  needs (meds, wound care, etc)  - Arrange for interpreters to assist at discharge as needed  - Consider post-discharge preferences of patient/family/discharge partner  - Complete POLST form as appropriate  - Assess patient's ability to be responsible for managing their own health  - Refer to Case Management Department for coordinating discharge planning if the patient needs post-hospital services based on physician/LIP order or complex needs related to functional status, cognitive ability or social support system  Outcome: Progressing

## 2024-10-13 ENCOUNTER — APPOINTMENT (OUTPATIENT)
Dept: GENERAL RADIOLOGY | Facility: HOSPITAL | Age: 89
DRG: 178 | End: 2024-10-13
Attending: INTERNAL MEDICINE
Payer: MEDICARE

## 2024-10-13 ENCOUNTER — APPOINTMENT (OUTPATIENT)
Dept: GENERAL RADIOLOGY | Facility: HOSPITAL | Age: 89
End: 2024-10-13
Attending: INTERNAL MEDICINE
Payer: MEDICARE

## 2024-10-13 LAB
BASOPHILS # BLD AUTO: 0.03 X10(3) UL (ref 0–0.2)
BASOPHILS NFR BLD AUTO: 0.3 %
BNP SERPL-MCNC: 325 PG/ML
DEPRECATED RDW RBC AUTO: 43.8 FL (ref 35.1–46.3)
EOSINOPHIL # BLD AUTO: 0.4 X10(3) UL (ref 0–0.7)
EOSINOPHIL NFR BLD AUTO: 3.6 %
ERYTHROCYTE [DISTWIDTH] IN BLOOD BY AUTOMATED COUNT: 13.2 % (ref 11–15)
HCT VFR BLD AUTO: 34 %
HGB BLD-MCNC: 10.9 G/DL
IMM GRANULOCYTES # BLD AUTO: 0.04 X10(3) UL (ref 0–1)
IMM GRANULOCYTES NFR BLD: 0.4 %
LYMPHOCYTES # BLD AUTO: 1.5 X10(3) UL (ref 1–4)
LYMPHOCYTES NFR BLD AUTO: 13.3 %
MCH RBC QN AUTO: 29.4 PG (ref 26–34)
MCHC RBC AUTO-ENTMCNC: 32.1 G/DL (ref 31–37)
MCV RBC AUTO: 91.6 FL
MONOCYTES # BLD AUTO: 1.03 X10(3) UL (ref 0.1–1)
MONOCYTES NFR BLD AUTO: 9.2 %
NEUTROPHILS # BLD AUTO: 8.24 X10 (3) UL (ref 1.5–7.7)
NEUTROPHILS # BLD AUTO: 8.24 X10(3) UL (ref 1.5–7.7)
NEUTROPHILS NFR BLD AUTO: 73.2 %
PLATELET # BLD AUTO: 216 10(3)UL (ref 150–450)
RBC # BLD AUTO: 3.71 X10(6)UL
RBC #/AREA URNS AUTO: >10 /HPF
WBC # BLD AUTO: 11.2 X10(3) UL (ref 4–11)

## 2024-10-13 PROCEDURE — 71045 X-RAY EXAM CHEST 1 VIEW: CPT | Performed by: INTERNAL MEDICINE

## 2024-10-13 RX ORDER — AMLODIPINE BESYLATE 2.5 MG/1
2.5 TABLET ORAL DAILY
Status: DISCONTINUED | OUTPATIENT
Start: 2024-10-13 | End: 2024-10-14

## 2024-10-13 NOTE — PROGRESS NOTES
[facility]  part of [facility]    Progress Note    Ayla Velez Patient Status:  Inpatient    1923 MRN [id number]   Location [facility] 4W/SW/SE Attending Ayla Swann MD   Hosp Day # 2 PCP AYLA SWANN MD, MD     Subjective:     Constitutional:  Negative for fever.   HENT:  Negative for ear pain and sinus pressure.    Respiratory:  Negative for choking and shortness of breath.    Cardiovascular:  Negative for leg swelling.   Gastrointestinal:  Negative for diarrhea.   Genitourinary:  Negative for flank pain.   Neurological:  Negative for light-headedness.       Objective:   Blood pressure (!) 161/95, pulse 102, temperature 98.8 °F (37.1 °C), temperature source Oral, resp. rate 18, weight 107 lb 14.4 oz (48.9 kg), SpO2 95%.  Physical Exam  Vitals reviewed. Exam conducted with a chaperone present.   Constitutional:       General: He is not in acute distress.     Appearance: He is normal weight.   HENT:      Head: Normocephalic.   Eyes:      Extraocular Movements: Extraocular movements intact.   Cardiovascular:      Rate and Rhythm: Normal rate.      Heart sounds: No murmur heard.  Pulmonary:      Breath sounds: No wheezing.   Abdominal:      General: There is no distension.   Musculoskeletal:         General: Normal range of motion.      Cervical back: Neck supple.   Neurological:      General: No focal deficit present.      Mental Status: He is alert and oriented to person, place, and time.         Results:     Lab Results   Component Value Date    WBC 11.2 (H) 10/13/2024    HGB 10.9 (L) 10/13/2024    HCT 34.0 (L) 10/13/2024    .0 10/13/2024    CREATSERUM 1.07 10/12/2024    BUN 27 (H) 10/12/2024     10/12/2024    K 4.5 10/12/2024     10/12/2024    CO2 26.0 10/12/2024     (H) 10/12/2024    CA 8.4 (L) 10/12/2024    ALB 3.6 10/12/2024    ALKPHO 72 10/12/2024    BILT 0.5 10/12/2024    TP 6.0 10/12/2024    AST 15 10/12/2024    ALT 10 10/12/2024     .0 (H) 12/09/2023    INR 1.00 12/09/2023    T4F 1.1 02/08/2024    TSH 3.219 02/08/2024    MG 1.8 11/20/2023       No results found.        Assessment and Plan:   Principal Problem:    Fever, unspecified fever cause  Active Problems:    Community acquired pneumonia of right lower lobe of lung    Leukocytosis    Azotemia    Hyperkalemia    Hyperglycemia  UTI      PLAN    Tele.  Iv rocephine.  PT eval.  UA today.  Home tomorrow if  stable.  Blood culture neg in 2 day.  Dw son.  Dw RN.  Labs in am.  Metoprolol 12.5 mg po bid today.  Scd both legs.      CAROLYN OROURKE MD, MD  10/11/2024

## 2024-10-13 NOTE — PLAN OF CARE
Patient is alert and oriented times 3. Pashto speaking only. Family at bedside.  Problem: PAIN - ADULT  Goal: Verbalizes/displays adequate comfort level or patient's stated pain goal  Description: INTERVENTIONS:  - Encourage pt to monitor pain and request assistance  - Assess pain using appropriate pain scale  - Administer analgesics based on type and severity of pain and evaluate response  - Implement non-pharmacological measures as appropriate and evaluate response  - Consider cultural and social influences on pain and pain management  - Manage/alleviate anxiety  - Utilize distraction and/or relaxation techniques  - Monitor for opioid side effects  - Notify MD/LIP if interventions unsuccessful or patient reports new pain  - Anticipate increased pain with activity and pre-medicate as appropriate  Outcome: Progressing     Problem: RISK FOR INFECTION - ADULT  Goal: Absence of fever/infection during anticipated neutropenic period  Description: INTERVENTIONS  - Monitor WBC  - Administer growth factors as ordered  - Implement neutropenic guidelines  Outcome: Progressing     Problem: SAFETY ADULT - FALL  Goal: Free from fall injury  Description: INTERVENTIONS:  - Assess pt frequently for physical needs  - Identify cognitive and physical deficits and behaviors that affect risk of falls.  - [place] fall precautions as indicated by assessment.  - Educate pt/family on patient safety including physical limitations  - Instruct pt to call for assistance with activity based on assessment  - Modify environment to reduce risk of injury  - Provide assistive devices as appropriate  - Consider OT/PT consult to assist with strengthening/mobility  - Encourage toileting schedule  Outcome: Progressing

## 2024-10-13 NOTE — PLAN OF CARE
Patient is from home with son, emilee Polanco. Son @ bedside. RA. Takes meds crushed with applesauce. Puree diet ( no straw). Saline locked. Rocephin continued. Voiding via primofit. PRN tylenol. Elevated BP overnight, MD notified one time dose of amlodipine given, added amlodipine back to his medications.Max assist. Call light within reach, frequent rounding. Safety measures in place. Plan for Rehab when medically clear       Problem: Patient Centered Care  Goal: Patient preferences are identified and integrated in the patient's plan of care  Description: Interventions:  - What would you like us to know as we care for you?   - Provide timely, complete, and accurate information to patient/family  - Incorporate patient and family knowledge, values, beliefs, and cultural backgrounds into the planning and delivery of care  - Encourage patient/family to participate in care and decision-making at the level they choose  - Honor patient and family perspectives and choices  Outcome: Progressing     Problem: PAIN - ADULT  Goal: Verbalizes/displays adequate comfort level or patient's stated pain goal  Description: INTERVENTIONS:  - Encourage pt to monitor pain and request assistance  - Assess pain using appropriate pain scale  - Administer analgesics based on type and severity of pain and evaluate response  - Implement non-pharmacological measures as appropriate and evaluate response  - Consider cultural and social influences on pain and pain management  - Manage/alleviate anxiety  - Utilize distraction and/or relaxation techniques  - Monitor for opioid side effects  - Notify MD/LIP if interventions unsuccessful or patient reports new pain  - Anticipate increased pain with activity and pre-medicate as appropriate  Outcome: Progressing     Problem: RISK FOR INFECTION - ADULT  Goal: Absence of fever/infection during anticipated neutropenic period  Description: INTERVENTIONS  - Monitor WBC  - Administer growth factors as ordered  -  Implement neutropenic guidelines  Outcome: Progressing     Problem: SAFETY ADULT - FALL  Goal: Free from fall injury  Description: INTERVENTIONS:  - Assess pt frequently for physical needs  - Identify cognitive and physical deficits and behaviors that affect risk of falls.  - [place] fall precautions as indicated by assessment.  - Educate pt/family on patient safety including physical limitations  - Instruct pt to call for assistance with activity based on assessment  - Modify environment to reduce risk of injury  - Provide assistive devices as appropriate  - Consider OT/PT consult to assist with strengthening/mobility  - Encourage toileting schedule  Outcome: Progressing     Problem: DISCHARGE PLANNING  Goal: Discharge to home or other facility with appropriate resources  Description: INTERVENTIONS:  - Identify barriers to discharge w/pt and caregiver  - Include patient/family/discharge partner in discharge planning  - Arrange for needed discharge resources and transportation as appropriate  - Identify discharge learning needs (meds, wound care, etc)  - Arrange for interpreters to assist at discharge as needed  - Consider post-discharge preferences of patient/family/discharge partner  - Complete POLST form as appropriate  - Assess patient's ability to be responsible for managing their own health  - Refer to Case Management Department for coordinating discharge planning if the patient needs post-hospital services based on physician/LIP order or complex needs related to functional status, cognitive ability or social support system  Outcome: Progressing     Problem: Altered Communication/Language Barrier  Goal: Patient/Family is able to understand and participate in their care  Description: Interventions:  - Assess communication ability and preferred communication style  - Implement communication aides and strategies  - Use visual cues when possible  - Listen attentively, be patient, do not interrupt  - Minimize  distractions  - Allow time for understanding and response  - Establish method for patient to ask for assistance (call light)  - Provide an  as needed  - Communicate barriers and strategies to overcome with those who interact with patient  Outcome: Progressing

## 2024-10-13 NOTE — PHYSICAL THERAPY NOTE
PHYSICAL THERAPY TREATMENT NOTE - INPATIENT     Room Number: 404/404-A       Presenting Problem: PNA  Co-Morbidities : Hx HTN, asthma    Problem List  Principal Problem:    Fever, unspecified fever cause  Active Problems:    Community acquired pneumonia of right lower lobe of lung    Leukocytosis    Azotemia    Hyperkalemia    Hyperglycemia      PHYSICAL THERAPY ASSESSMENT   Patient demonstrates fair progress this session, goals  remain in progress.      Patient is requiring minimal assist and moderate assist as a result of the following impairments: decreased functional strength, decreased endurance/aerobic capacity, impaired dynamic balance, and medical status.     Patient continues to function below baseline with bed mobility, transfers, gait, and stair negotiation.  Next session anticipate patient to progress bed mobility, transfers, gait, and stair negotiation.  Physical Therapy will continue to follow patient for duration of hospitalization.    Patient continues to benefit from continued skilled PT services: to promote return to prior level of function and safety with continuous assistance and gradual rehabilitative therapy .    PLAN DURING HOSPITALIZATION  Nursing Mobility Recommendation : 1 Assist  PT Device Recommendation: Rolling walker;Mechanical lift;Wheelchair  PT Treatment Plan: Bed mobility;Body mechanics;Endurance;Coordination;Energy conservation;Patient education;Gait training;Family education;Balance training;Transfer training;Strengthening;Stair training  Frequency (Obs): 3-5x/week     SUBJECTIVE  \"I'll get up for the bathroom\"- translated by son    OBJECTIVE  Precautions:  needed (Nepali-speaking)    WEIGHT BEARING RESTRICTION       PAIN ASSESSMENT   Ratin  Location: denies  Management Techniques: Body mechanics;Repositioning    BALANCE  Static Sitting: Fair +  Dynamic Sitting: Fair -  Static Standing: Poor  Dynamic Standing: Poor -    AM-PAC '6-Clicks' INPATIENT SHORT FORM - BASIC  MOBILITY  How much difficulty does the patient currently have...  Patient Difficulty: Turning over in bed (including adjusting bedclothes, sheets and blankets)?: A Lot   Patient Difficulty: Sitting down on and standing up from a chair with arms (e.g., wheelchair, bedside commode, etc.): A Lot   Patient Difficulty: Moving from lying on back to sitting on the side of the bed?: A Lot   How much help from another person does the patient currently need...   Help from Another: Moving to and from a bed to a chair (including a wheelchair)?: A Lot   Help from Another: Need to walk in hospital room?: A Lot   Help from Another: Climbing 3-5 steps with a railing?: Total     AM-PAC Score:  Raw Score: 11   Approx Degree of Impairment: 72.57%   Standardized Score (AM-PAC Scale): 33.86   CMS Modifier (G-Code): CL    FUNCTIONAL ABILITY STATUS  Functional Mobility/Gait Assessment  Gait Assistance: Moderate assistance  Distance (ft): 12ft  Assistive Device: Rolling walker  Pattern: Shuffle (unsteady gait, B genu varus)  Supine to Sit: moderate assist  Sit to Stand: minimal assist    Skilled Therapy Provided: Pt rec'd in bed agreeable to therapy, identified by name and , gait belt donned for mobility. Son at bedside and able to translate. Visual and tactile cues used frequently throughout session for pt participation. Pt benefits from increased time to complete functional tasks. Pt requires ModA to support trunk and guide BLE off of EOB. Pt with En for STS from EOB and RW. Pt is able to tolerate ambulation of 12ft with RW and ModA. Pt demos slow robe, B genu varus, and unsteady gait 2/2 impaired dynamic balance. Increase work of breathing noted with this distance. Pt left in bathroom with son present to finish tolieting needs. Pt son reports preference of HHPT upon discharge assuring he will be available to assist pt with functional tasks.     The patient's Approx Degree of Impairment: 72.57% has been calculated based on  documentation in the [facility] '6 clicks' Inpatient Daily Activity Short Form.  Research supports that patients with this level of impairment may benefit from rehab.  Final disposition will be made by interdisciplinary medical team.    Patient End of Session: Needs met;Call light within reach;RN aware of session/findings (On toliet)    CURRENT GOALS   Goals to be met by: 11/11/24  Patient Goal Patient's self-stated goal is: return to PLOF   Goal #1 Patient is able to demonstrate supine - sit EOB @ level: supervision      Goal #1   Current Status  NOT MET/ IN PROGRESS   Goal #2 Patient is able to demonstrate transfers EOB to/from [facility] at assistance level: minimum assistance with walker - rolling      Goal #2  Current Status  NOT MET/ IN PROGRESS   Goal #3 Patient is able to ambulate 50 feet with assist device: walker - rolling at assistance level: minimum assistance   Goal #3   Current Status  NOT MET/ IN PROGRESS   Goal #4 Patient will negotiate 8 stairs/one curb w/ assistive device and minimum assistance    Goal #4   Current Status  NOT MET/ IN PROGRESS   Goal #5 Patient to demonstrate independence with home activity/exercise instructions provided to patient in preparation for discharge.   Goal #5   Current Status  NOT MET/ IN PROGRESS   Goal #6     Goal #6  Current Status       Therapeutic Activity: 23 minutes    This treatment was completed under direct supervision of clinical instructor. I have reviewed and agree with the above documentation.    LEONOR Martinez

## 2024-10-14 LAB
BASOPHILS # BLD AUTO: 0.05 X10(3) UL (ref 0–0.2)
BASOPHILS NFR BLD AUTO: 0.6 %
DEPRECATED RDW RBC AUTO: 43.8 FL (ref 35.1–46.3)
EOSINOPHIL # BLD AUTO: 0.38 X10(3) UL (ref 0–0.7)
EOSINOPHIL NFR BLD AUTO: 4.8 %
ERYTHROCYTE [DISTWIDTH] IN BLOOD BY AUTOMATED COUNT: 13.1 % (ref 11–15)
GLUCOSE BLDC GLUCOMTR-MCNC: 111 MG/DL (ref 70–99)
GLUCOSE BLDC GLUCOMTR-MCNC: 92 MG/DL (ref 70–99)
HCT VFR BLD AUTO: 30.3 %
HGB BLD-MCNC: 9.6 G/DL
IMM GRANULOCYTES # BLD AUTO: 0.03 X10(3) UL (ref 0–1)
IMM GRANULOCYTES NFR BLD: 0.4 %
LYMPHOCYTES # BLD AUTO: 1.63 X10(3) UL (ref 1–4)
LYMPHOCYTES NFR BLD AUTO: 20.7 %
MCH RBC QN AUTO: 29 PG (ref 26–34)
MCHC RBC AUTO-ENTMCNC: 31.7 G/DL (ref 31–37)
MCV RBC AUTO: 91.5 FL
MONOCYTES # BLD AUTO: 0.85 X10(3) UL (ref 0.1–1)
MONOCYTES NFR BLD AUTO: 10.8 %
NEUTROPHILS # BLD AUTO: 4.95 X10 (3) UL (ref 1.5–7.7)
NEUTROPHILS # BLD AUTO: 4.95 X10(3) UL (ref 1.5–7.7)
NEUTROPHILS NFR BLD AUTO: 62.7 %
PLATELET # BLD AUTO: 216 10(3)UL (ref 150–450)
RBC # BLD AUTO: 3.31 X10(6)UL
WBC # BLD AUTO: 7.9 X10(3) UL (ref 4–11)

## 2024-10-14 PROCEDURE — 99223 1ST HOSP IP/OBS HIGH 75: CPT | Performed by: INTERNAL MEDICINE

## 2024-10-14 RX ORDER — AMLODIPINE BESYLATE 2.5 MG/1
2.5 TABLET ORAL 2 TIMES DAILY
Status: DISCONTINUED | OUTPATIENT
Start: 2024-10-14 | End: 2024-10-16

## 2024-10-14 RX ORDER — AMLODIPINE BESYLATE 2.5 MG/1
2.5 TABLET ORAL 2 TIMES DAILY
Status: DISCONTINUED | OUTPATIENT
Start: 2024-10-14 | End: 2024-10-14

## 2024-10-14 NOTE — DISCHARGE INSTRUCTIONS
Diet Recommendations - Solids: Puree  Diet Recommendations - Liquids: Thin Liquids  No straws;Slow rate;Alternate consistencies;Small bites and sips  Upright position;Slow rate;Small bites and sips;No straw

## 2024-10-14 NOTE — PHYSICAL THERAPY NOTE
Chart reviewed and RN approved. Attempted physical therapy treatment at 1141. Son at bedside and able to translate. Son says pt is fatigued at the moment and would prefer therapy at a later time. Son says to attempt again around 1400 when he returns to hospital. Will follow up.    Second attempt for treatment at 1407. Son not present. Used visual cues for consent, pt visibly declining treatment at this time. Will follow up when schedule permits and is appropriate.    Maryann Garcia, DAVIDA

## 2024-10-14 NOTE — PLAN OF CARE
Patient is from home with son, emilee Polanco. Son @ bedside. RA. Remote tele. Takes meds crushed with applesauce. Puree diet ( no straw). Rocephin & Azithromycin continued. Saline locked.Voiding via primofit. PRN tylenol. PRN Nebs Q6 for wheezing. Up by 2 assist with a rolling chair.  Call light within reach, frequent rounding. Safety measures in place.  Plan for home when medically clear.          Problem: Patient Centered Care  Goal: Patient preferences are identified and integrated in the patient's plan of care  Description: Interventions:  - What would you like us to know as we care for you?  2  - Provide timely, complete, and accurate information to patient/family  - Incorporate patient and family knowledge, values, beliefs, and cultural backgrounds into the planning and delivery of care  - Encourage patient/family to participate in care and decision-making at the level they choose  - Honor patient and family perspectives and choices  Outcome: Progressing     Problem: PAIN - ADULT  Goal: Verbalizes/displays adequate comfort level or patient's stated pain goal  Description: INTERVENTIONS:  - Encourage pt to monitor pain and request assistance  - Assess pain using appropriate pain scale  - Administer analgesics based on type and severity of pain and evaluate response  - Implement non-pharmacological measures as appropriate and evaluate response  - Consider cultural and social influences on pain and pain management  - Manage/alleviate anxiety  - Utilize distraction and/or relaxation techniques  - Monitor for opioid side effects  - Notify MD/LIP if interventions unsuccessful or patient reports new pain  - Anticipate increased pain with activity and pre-medicate as appropriate  Outcome: Progressing     Problem: RISK FOR INFECTION - ADULT  Goal: Absence of fever/infection during anticipated neutropenic period  Description: INTERVENTIONS  - Monitor WBC  - Administer growth factors as ordered  - Implement neutropenic  guidelines  Outcome: Progressing     Problem: SAFETY ADULT - FALL  Goal: Free from fall injury  Description: INTERVENTIONS:  - Assess pt frequently for physical needs  - Identify cognitive and physical deficits and behaviors that affect risk of falls.  - [place] fall precautions as indicated by assessment.  - Educate pt/family on patient safety including physical limitations  - Instruct pt to call for assistance with activity based on assessment  - Modify environment to reduce risk of injury  - Provide assistive devices as appropriate  - Consider OT/PT consult to assist with strengthening/mobility  - Encourage toileting schedule  Outcome: Progressing     Problem: DISCHARGE PLANNING  Goal: Discharge to home or other facility with appropriate resources  Description: INTERVENTIONS:  - Identify barriers to discharge w/pt and caregiver  - Include patient/family/discharge partner in discharge planning  - Arrange for needed discharge resources and transportation as appropriate  - Identify discharge learning needs (meds, wound care, etc)  - Arrange for interpreters to assist at discharge as needed  - Consider post-discharge preferences of patient/family/discharge partner  - Complete POLST form as appropriate  - Assess patient's ability to be responsible for managing their own health  - Refer to Case Management Department for coordinating discharge planning if the patient needs post-hospital services based on physician/LIP order or complex needs related to functional status, cognitive ability or social support system  Outcome: Progressing     Problem: Altered Communication/Language Barrier  Goal: Patient/Family is able to understand and participate in their care  Description: Interventions:  - Assess communication ability and preferred communication style  - Implement communication aides and strategies  - Use visual cues when possible  - Listen attentively, be patient, do not interrupt  - Minimize distractions  - Allow  time for understanding and response  - Establish method for patient to ask for assistance (call light)  - Provide an  as needed  - Communicate barriers and strategies to overcome with those who interact with patient  Outcome: Progressing

## 2024-10-14 NOTE — CONGREGATE LIVING REVIEW
[facility] Living Authorization    The [facility] Review Committee has reviewed this case and the patient IS APPROVED for discharge to a facility for Short Term Skilled once the following procedure is followed:     - The physician discharge instructions (contained within the CARLOS ENRIQUE note for SNF) must inlcude the below appropriate and approved COVID instructions to the facility    For questions regarding [facility] approval process, please contact the CM assigned to the case.  For questions regarding RN discharge workflow, please contact the unit Clinical Leader.

## 2024-10-14 NOTE — PROGRESS NOTES
[facility]  part of [facility]    Progress Note    Ayla Velez Patient Status:  Inpatient    1923 MRN [id number]   Location [facility] 4W/SW/SE Attending Ayla Swann MD   Hosp Day # 3 PCP AYLA SWANN MD, MD     Subjective:     Constitutional:  Negative for fever.   HENT:  Negative for ear pain and sinus pressure.    Respiratory:  Negative for choking and shortness of breath.    Cardiovascular:  Negative for leg swelling.   Gastrointestinal:  Negative for diarrhea.   Genitourinary:  Negative for flank pain.   Neurological:  Negative for light-headedness.       Objective:   Blood pressure 152/87, pulse 78, temperature 97.7 °F (36.5 °C), temperature source Axillary, resp. rate 18, weight 107 lb 14.4 oz (48.9 kg), SpO2 97%.  Physical Exam  Vitals reviewed. Exam conducted with a chaperone present.   Constitutional:       General: He is not in acute distress.     Appearance: He is normal weight.   HENT:      Head: Normocephalic.   Eyes:      Extraocular Movements: Extraocular movements intact.   Cardiovascular:      Rate and Rhythm: Normal rate.      Heart sounds: No murmur heard.  Pulmonary:      Breath sounds: No wheezing.   Abdominal:      General: There is no distension.   Musculoskeletal:         General: Normal range of motion.      Cervical back: Neck supple.   Neurological:      General: No focal deficit present.      Mental Status: He is alert and oriented to person, place, and time.         Results:     Lab Results   Component Value Date    WBC 7.9 10/14/2024    HGB 9.6 (L) 10/14/2024    HCT 30.3 (L) 10/14/2024    .0 10/14/2024    CREATSERUM 1.07 10/12/2024    BUN 27 (H) 10/12/2024     10/12/2024    K 4.5 10/12/2024     10/12/2024    CO2 26.0 10/12/2024     (H) 10/12/2024    CA 8.4 (L) 10/12/2024    ALB 3.6 10/12/2024    ALKPHO 72 10/12/2024    BILT 0.5 10/12/2024    TP 6.0 10/12/2024    AST 15 10/12/2024    ALT 10 10/12/2024    PTT  117.0 (H) 12/09/2023    INR 1.00 12/09/2023    T4F 1.1 02/08/2024    TSH 3.219 02/08/2024    MG 1.8 11/20/2023       XR CHEST AP/PA (1 VIEW) (CPT=71045)    Result Date: 10/13/2024  CONCLUSION:  1. Developing left lower lobe consolidation, which could relate to atelectasis and/or pneumonia. 2. Otherwise stable mild scarring in the mid to lower lungs bilaterally.    Dictated by (CST): Noam Jean MD on 10/13/2024 at 2:17 PM     Finalized by (CST): Noam Jean MD on 10/13/2024 at 2:19 PM               Assessment and Plan:   Principal Problem:    Fever, unspecified fever cause  Active Problems:    Community acquired pneumonia of right lower lobe of lung    Leukocytosis    Azotemia    Hyperkalemia    Hyperglycemia  UTI  Left pneumonia      PLAN    Tele.  Iv rocephine.  Azithromycin iv.  Consult pulmonary.  Home today if ok with pulm.  PT eval  Dw son.  Dw RN.  Aspirin 325mg for dvt proph.  Labs in am.  Metoprolol 12.5 mg po bid.  Scd both legs.      CAROLYN OROURKE MD, MD  10/11/2024

## 2024-10-14 NOTE — PLAN OF CARE
Alert and oriented x 3 and at baseline. Receiving IV antibiotics per MD order. Voiding via purewick. Aspirin for DVT prophylaxis. Denies pain. Vital signs monitored. No acute changes noted throughout shift. Tolerating modified diet. Pulmonology placed on consult. Fall precautions in place- bed in lowest position, call light and personal belongings within reach, non-skid socks in place. Frequent rounding by nursing staff. Family at bedside for majority of shift Plan is home pending medical clearance. Son stating patient previously had home nebulizer that helped the best with his cough in the past. Attempted to call pharmacy but unable to reach pharmacy staff to discuss. Encouraged son to try calling pharmacy as well.    Problem: Patient Centered Care  Goal: Patient preferences are identified and integrated in the patient's plan of care  Description: Interventions:  - What would you like us to know as we care for you? From home with family  - Provide timely, complete, and accurate information to patient/family  - Incorporate patient and family knowledge, values, beliefs, and cultural backgrounds into the planning and delivery of care  - Encourage patient/family to participate in care and decision-making at the level they choose  - Honor patient and family perspectives and choices  Outcome: Progressing     Problem: PAIN - ADULT  Goal: Verbalizes/displays adequate comfort level or patient's stated pain goal  Description: INTERVENTIONS:  - Encourage pt to monitor pain and request assistance  - Assess pain using appropriate pain scale  - Administer analgesics based on type and severity of pain and evaluate response  - Implement non-pharmacological measures as appropriate and evaluate response  - Consider cultural and social influences on pain and pain management  - Manage/alleviate anxiety  - Utilize distraction and/or relaxation techniques  - Monitor for opioid side effects  - Notify MD/LIP if interventions unsuccessful  or patient reports new pain  - Anticipate increased pain with activity and pre-medicate as appropriate  Outcome: Progressing     Problem: RISK FOR INFECTION - ADULT  Goal: Absence of fever/infection during anticipated neutropenic period  Description: INTERVENTIONS  - Monitor WBC  - Administer growth factors as ordered  - Implement neutropenic guidelines  Outcome: Progressing     Problem: SAFETY ADULT - FALL  Goal: Free from fall injury  Description: INTERVENTIONS:  - Assess pt frequently for physical needs  - Identify cognitive and physical deficits and behaviors that affect risk of falls.  - [place] fall precautions as indicated by assessment.  - Educate pt/family on patient safety including physical limitations  - Instruct pt to call for assistance with activity based on assessment  - Modify environment to reduce risk of injury  - Provide assistive devices as appropriate  - Consider OT/PT consult to assist with strengthening/mobility  - Encourage toileting schedule  Outcome: Progressing     Problem: DISCHARGE PLANNING  Goal: Discharge to home or other facility with appropriate resources  Description: INTERVENTIONS:  - Identify barriers to discharge w/pt and caregiver  - Include patient/family/discharge partner in discharge planning  - Arrange for needed discharge resources and transportation as appropriate  - Identify discharge learning needs (meds, wound care, etc)  - Arrange for interpreters to assist at discharge as needed  - Consider post-discharge preferences of patient/family/discharge partner  - Complete POLST form as appropriate  - Assess patient's ability to be responsible for managing their own health  - Refer to Case Management Department for coordinating discharge planning if the patient needs post-hospital services based on physician/LIP order or complex needs related to functional status, cognitive ability or social support system  Outcome: Progressing     Problem: Altered Communication/Language  Barrier  Goal: Patient/Family is able to understand and participate in their care  Description: Interventions:  - Assess communication ability and preferred communication style  - Implement communication aides and strategies  - Use visual cues when possible  - Listen attentively, be patient, do not interrupt  - Minimize distractions  - Allow time for understanding and response  - Establish method for patient to ask for assistance (call light)  - Provide an  as needed  - Communicate barriers and strategies to overcome with those who interact with patient  Outcome: Progressing

## 2024-10-14 NOTE — SLP NOTE
SPEECH DAILY NOTE - INPATIENT    ASSESSMENT & PLAN   ASSESSMENT  PPE REQUIRED. THIS THERAPIST WORE GLOVES FOR DURATION OF EVALUATION. HANDS WASHED UPON ENTRANCE/EXIT.    SLP f/u for ongoing dysphagia tx/meal assessment per recommendations of puree/thin liquids per BSE. RN reports pt tolerates diet and medication well with no overt clinical s/s aspiration. Pt's son denies any swallowing challenges.     Pt positioned upright in bed, alert/cooperative. Pt afebrile, tolerating room air with oxygen status 97% prior to the start of oral trials. SLP reviewed aspiration precautions and safe swallowing compensatory strategies with the patient. Safe swallow guidelines remain written on the white board in purple. Patient and family v/u. Provided 1:1 assistance, pt tolerates puree and thin liquids via cup sips with no overt clinical signs/symptoms of aspiration. Pt does exhibit an audible swallow, family reports baseline. Family also reports pt eats and drinks quickly and large amounts at home. Pt's son educated on thickened liquids and modified cups. He is not interested at this time. Oxygen status remained stable t/o the entire session. Recommend remain on current diet with STRICT adherence to aspiration precautions and swallow strategies. No further swallow services warranted at this time. Please re consult if needed. RN alerted with results and recommendations.     MOST RECENT CXR 10/13  CONCLUSION:   1. Developing left lower lobe consolidation, which could relate to atelectasis and/or pneumonia.   2. Otherwise stable mild scarring in the mid to lower lungs bilaterally.       Diet Recommendations - Solids: Puree  Diet Recommendations - Liquids: Thin Liquids    Compensatory Strategies Recommended: No straws;Slow rate;Alternate consistencies;Small bites and sips  Aspiration Precautions: Upright position;Slow rate;Small bites and sips;No straw  Medication Administration Recommendations:  (as tolerated)    Patient Experiencing  Pain: No              Treatment Plan  Treatment Plan/Recommendations: No further inpatient SLP service warranted;Aspiration precautions    Interdisciplinary Communication: Plan posted at bedside          GOALS  Goal #1 The patient will tolerate puree consistency and thin liquids without overt signs or symptoms of aspiration with 100 % accuracy over 1-2 session(s).  Goal Met 10/14   Goal #2 The patient/family/caregiver will demonstrate understanding and implementation of aspiration precautions and swallow strategies independently over 1-2 session(s).     Goal Met 10/14   Goal #3 The patient will utilize compensatory strategies as outlined by  BSSE (clinical evaluation) including Slow rate, Small bites, Small sips, No straws, Upright 90 degrees, Eliminate distractions, Supervision with meals with PRN assistance 100 % of the time across 1-2 sessions.  Goal Met 10/14     FOLLOW UP  Follow Up Needed (Documentation Required): No  SLP Follow-up Date: 10/14/24  Number of Visits to Meet Established Goals: 3    Session: 2    If you have any questions, please contact MYLES Deras M.S. CCC-SLP  Speech Language Pathologist  Phone Number [phone]. [phone]

## 2024-10-14 NOTE — CONSULTS
[facility]  part of [facility]    Consult Note    Date:  10/14/2024  Date of Admission:  10/11/2024    Chief Complaint:   Ayla Vleez is a(n) 89 year old male with pneumonia.    HPI:   The patient has a history of aspiration pneumonitis.  He also carries a diagnosis of asthma.  He has a prior history of tongue cancer and his son notes that his tongue is twisted.  The patient also has been treated for lymphoma.  He now was brought to the emergency room with failing to thrive with increased dyspnea and inability to stand.  There was no fever, chills, shakes.  He does have cough and occasionally chokes on food.  He is currently getting puréed foods with thin liquids.  He is wakeful but his son has provided the history.    History     Past Medical History:    Asthma (HCC)    Cancer (HCC)    Essential hypertension     History reviewed. No pertinent surgical history.  History reviewed. No pertinent family history.  Social History:  Social History     Socioeconomic History    Marital status: Single   Tobacco Use    Smoking status: Never    Smokeless tobacco: Never   Substance and Sexual Activity    Alcohol use: Never     Social Drivers of Health     Food Insecurity: No Food Insecurity (10/11/2024)    Food Insecurity     Food Insecurity: Never true   Transportation Needs: No Transportation Needs (10/11/2024)    Transportation Needs     Lack of Transportation: No    [facility]    Social Connections   Housing Stability: Low Risk  (10/11/2024)    Housing Stability     Housing Instability: No     Allergies/Medications:   Allergies: Allergies[1]  Medications Prior to Admission   Medication Sig    amLODIPine 2.5 MG Oral Tab Take 1 tablet (2.5 mg total) by mouth as needed (patients son states he gives as needed every 2 to 4 days).    aspirin 81 MG Oral Chew Tab Chew 1 tablet (81 mg total) by mouth daily.    atorvastatin 20 MG Oral Tab  Take 1 tablet (20 mg total) by mouth nightly.    metoprolol tartrate 25 MG Oral Tab Take 1 tablet (25 mg total) by mouth 2 (two) times daily.    levothyroxine 88 MCG Oral Tab Take 1 tablet (88 mcg total) by mouth before breakfast.    albuterol (2.5 MG/3ML) 0.083% Inhalation Nebu Soln Take by nebulization every 6 (six) hours as needed for Wheezing.    SYMBICORT 160-4.5 MCG/ACT Inhalation Aerosol Inhale 2 puffs into the lungs in the morning and 2 puffs before bedtime.    albuterol 108 (90 Base) MCG/ACT Inhalation Aero Soln Inhale 1 puff into the lungs every 6 (six) hours as needed for Wheezing or Shortness of Breath.    montelukast 10 MG Oral Tab Take 1 tablet (10 mg total) by mouth daily.    tamsulosin 0.4 MG Oral Cap Take 1 capsule (0.4 mg total) by mouth daily.    lisinopril 5 MG Oral Tab Take 1 tablet (5 mg total) by mouth in the morning and 1 tablet (5 mg total) before bedtime.       Review of Systems:   Review of Systems:  Vision normal. Ear nose and throat normal. Bowel normal. Bladder function abnormal. No depression. No thyroid disease. No lymphatic system concerns.  No rash. Muscles and joints unremarkable. No weight loss no weight gain.    Physical Exam:   Vital Signs:  Blood pressure 152/87, pulse 78, temperature 97.7 °F (36.5 °C), temperature source Axillary, resp. rate 18, weight 107 lb 14.4 oz (48.9 kg), SpO2 97%.     Alert elderly male  HEENT examination is unremarkable with pupils equal round and reactive to light and accommodation.   Neck without adenopathy, thyromegaly, JVD nor bruit.   Lungs bibasilar inspiratory crackles to auscultation and percussion.  Cardiac regular rate and rhythm no murmur.   Abdomen nontender, without hepatosplenomegaly and no mass appreciable.   Extremities without clubbing cyanosis nor edema.   Neurologic grossly intact with symmetric tone and strength and reflex.  Skin without gross abnormality    Results:     Lab Results   Component Value Date    WBC 7.9 10/14/2024     HGB 9.6 10/14/2024    HCT 30.3 10/14/2024    .0 10/14/2024     Chest x-ray-left greater than right basilar consolidation    Assessment/Plan:   1.  Pneumonia-left greater than right, probably aspiration  Since he is 89 years old, and the x-ray had worsened with significant basilar infiltrates.  I favor keeping him another day.    Recommendations:  1.  Would anticipate discharge home tomorrow  2.  At discharge, can transition to 7 days of Augmentin  3.  Will follow clinically  4.  Ongoing swallowing strategies    2.  Asthma-continue current inhaler usage    3.  DVT prophylaxis-SCDs    4.  CODE STATUS-full reported    I am delighted to assist with this patient's care.      Cheng Christiansen MD  Medical Director, Critical Care, [facility]  Medical Director, [facility]  Pager: [phone]             [1] No Known Allergies

## 2024-10-15 LAB
BASOPHILS # BLD AUTO: 0.05 X10(3) UL (ref 0–0.2)
BASOPHILS NFR BLD AUTO: 0.6 %
BILIRUB UR QL: NEGATIVE
DEPRECATED RDW RBC AUTO: 43.9 FL (ref 35.1–46.3)
EOSINOPHIL # BLD AUTO: 0.45 X10(3) UL (ref 0–0.7)
EOSINOPHIL NFR BLD AUTO: 5.2 %
ERYTHROCYTE [DISTWIDTH] IN BLOOD BY AUTOMATED COUNT: 13.2 % (ref 11–15)
GLUCOSE UR-MCNC: NORMAL MG/DL
HCT VFR BLD AUTO: 32.2 %
HGB BLD-MCNC: 10.3 G/DL
IMM GRANULOCYTES # BLD AUTO: 0.03 X10(3) UL (ref 0–1)
IMM GRANULOCYTES NFR BLD: 0.3 %
KETONES UR-MCNC: NEGATIVE MG/DL
LEUKOCYTE ESTERASE UR QL STRIP.AUTO: 25
LYMPHOCYTES # BLD AUTO: 1.95 X10(3) UL (ref 1–4)
LYMPHOCYTES NFR BLD AUTO: 22.6 %
MCH RBC QN AUTO: 29.3 PG (ref 26–34)
MCHC RBC AUTO-ENTMCNC: 32 G/DL (ref 31–37)
MCV RBC AUTO: 91.5 FL
MONOCYTES # BLD AUTO: 0.9 X10(3) UL (ref 0.1–1)
MONOCYTES NFR BLD AUTO: 10.5 %
NEUTROPHILS # BLD AUTO: 5.23 X10 (3) UL (ref 1.5–7.7)
NEUTROPHILS # BLD AUTO: 5.23 X10(3) UL (ref 1.5–7.7)
NEUTROPHILS NFR BLD AUTO: 60.8 %
NITRITE UR QL STRIP.AUTO: NEGATIVE
PH UR: 6 [PH] (ref 5–8)
PLATELET # BLD AUTO: 238 10(3)UL (ref 150–450)
PROT UR-MCNC: 30 MG/DL
RBC # BLD AUTO: 3.52 X10(6)UL
RBC #/AREA URNS AUTO: >10 /HPF
SP GR UR STRIP: 1.02 (ref 1–1.03)
T4 FREE SERPL-MCNC: 1 NG/DL (ref 0.8–1.7)
TSI SER-ACNC: 1.83 MIU/ML (ref 0.55–4.78)
UROBILINOGEN UR STRIP-ACNC: NORMAL
WBC # BLD AUTO: 8.6 X10(3) UL (ref 4–11)
WBC #/AREA URNS AUTO: >50 /HPF

## 2024-10-15 PROCEDURE — 99232 SBSQ HOSP IP/OBS MODERATE 35: CPT | Performed by: INTERNAL MEDICINE

## 2024-10-15 RX ORDER — METOPROLOL TARTRATE 25 MG/1
25 TABLET, FILM COATED ORAL ONCE
Status: COMPLETED | OUTPATIENT
Start: 2024-10-15 | End: 2024-10-15

## 2024-10-15 RX ORDER — METOPROLOL TARTRATE 25 MG/1
25 TABLET, FILM COATED ORAL
Status: DISCONTINUED | OUTPATIENT
Start: 2024-10-15 | End: 2024-10-16

## 2024-10-15 NOTE — PLAN OF CARE
Patient is alert and oriented. On RA. Tele in place. Voiding freely, using primofit.  Call light within reach, bed alarm active. Family at bedside throughout shift    Problem: Patient Centered Care  Goal: Patient preferences are identified and integrated in the patient's plan of care  Description: Interventions:  - What would you like us to know as we care for you? From home with family  - Provide timely, complete, and accurate information to patient/family  - Incorporate patient and family knowledge, values, beliefs, and cultural backgrounds into the planning and delivery of care  - Encourage patient/family to participate in care and decision-making at the level they choose  - Honor patient and family perspectives and choices  Outcome: Progressing     Problem: PAIN - ADULT  Goal: Verbalizes/displays adequate comfort level or patient's stated pain goal  Description: INTERVENTIONS:  - Encourage pt to monitor pain and request assistance  - Assess pain using appropriate pain scale  - Administer analgesics based on type and severity of pain and evaluate response  - Implement non-pharmacological measures as appropriate and evaluate response  - Consider cultural and social influences on pain and pain management  - Manage/alleviate anxiety  - Utilize distraction and/or relaxation techniques  - Monitor for opioid side effects  - Notify MD/LIP if interventions unsuccessful or patient reports new pain  - Anticipate increased pain with activity and pre-medicate as appropriate  Outcome: Progressing     Problem: RISK FOR INFECTION - ADULT  Goal: Absence of fever/infection during anticipated neutropenic period  Description: INTERVENTIONS  - Monitor WBC  - Administer growth factors as ordered  - Implement neutropenic guidelines  Outcome: Progressing     Problem: SAFETY ADULT - FALL  Goal: Free from fall injury  Description: INTERVENTIONS:  - Assess pt frequently for physical needs  - Identify cognitive and physical deficits and  behaviors that affect risk of falls.  - [place] fall precautions as indicated by assessment.  - Educate pt/family on patient safety including physical limitations  - Instruct pt to call for assistance with activity based on assessment  - Modify environment to reduce risk of injury  - Provide assistive devices as appropriate  - Consider OT/PT consult to assist with strengthening/mobility  - Encourage toileting schedule  Outcome: Progressing     Problem: DISCHARGE PLANNING  Goal: Discharge to home or other facility with appropriate resources  Description: INTERVENTIONS:  - Identify barriers to discharge w/pt and caregiver  - Include patient/family/discharge partner in discharge planning  - Arrange for needed discharge resources and transportation as appropriate  - Identify discharge learning needs (meds, wound care, etc)  - Arrange for interpreters to assist at discharge as needed  - Consider post-discharge preferences of patient/family/discharge partner  - Complete POLST form as appropriate  - Assess patient's ability to be responsible for managing their own health  - Refer to Case Management Department for coordinating discharge planning if the patient needs post-hospital services based on physician/LIP order or complex needs related to functional status, cognitive ability or social support system  Outcome: Progressing     Problem: Altered Communication/Language Barrier  Goal: Patient/Family is able to understand and participate in their care  Description: Interventions:  - Assess communication ability and preferred communication style  - Implement communication aides and strategies  - Use visual cues when possible  - Listen attentively, be patient, do not interrupt  - Minimize distractions  - Allow time for understanding and response  - Establish method for patient to ask for assistance (call light)  - Provide an  as needed  - Communicate barriers and strategies to overcome with those who interact with  patient  Outcome: Progressing

## 2024-10-15 NOTE — PLAN OF CARE
Patient alert and oriented x3. Indonesian speaking, family at bedside to translate. Ambulating standby with walker. Receiving IV abx. Primofit in place, changed. Mepilex to sacrum.   Pt converted to afib this AM. Cardiology consulted.  Pending ECHO.  Plan is home with son when medically cleared with PO abx.    Problem: Patient Centered Care  Goal: Patient preferences are identified and integrated in the patient's plan of care  Description: Interventions:  - What would you like us to know as we care for you? From home with family  - Provide timely, complete, and accurate information to patient/family  - Incorporate patient and family knowledge, values, beliefs, and cultural backgrounds into the planning and delivery of care  - Encourage patient/family to participate in care and decision-making at the level they choose  - Honor patient and family perspectives and choices  Outcome: Progressing     Problem: PAIN - ADULT  Goal: Verbalizes/displays adequate comfort level or patient's stated pain goal  Description: INTERVENTIONS:  - Encourage pt to monitor pain and request assistance  - Assess pain using appropriate pain scale  - Administer analgesics based on type and severity of pain and evaluate response  - Implement non-pharmacological measures as appropriate and evaluate response  - Consider cultural and social influences on pain and pain management  - Manage/alleviate anxiety  - Utilize distraction and/or relaxation techniques  - Monitor for opioid side effects  - Notify MD/LIP if interventions unsuccessful or patient reports new pain  - Anticipate increased pain with activity and pre-medicate as appropriate  Outcome: Progressing     Problem: RISK FOR INFECTION - ADULT  Goal: Absence of fever/infection during anticipated neutropenic period  Description: INTERVENTIONS  - Monitor WBC  - Administer growth factors as ordered  - Implement neutropenic guidelines  Outcome: Progressing     Problem: SAFETY ADULT - FALL  Goal: Free  from fall injury  Description: INTERVENTIONS:  - Assess pt frequently for physical needs  - Identify cognitive and physical deficits and behaviors that affect risk of falls.  - [place] fall precautions as indicated by assessment.  - Educate pt/family on patient safety including physical limitations  - Instruct pt to call for assistance with activity based on assessment  - Modify environment to reduce risk of injury  - Provide assistive devices as appropriate  - Consider OT/PT consult to assist with strengthening/mobility  - Encourage toileting schedule  Outcome: Progressing     Problem: DISCHARGE PLANNING  Goal: Discharge to home or other facility with appropriate resources  Description: INTERVENTIONS:  - Identify barriers to discharge w/pt and caregiver  - Include patient/family/discharge partner in discharge planning  - Arrange for needed discharge resources and transportation as appropriate  - Identify discharge learning needs (meds, wound care, etc)  - Arrange for interpreters to assist at discharge as needed  - Consider post-discharge preferences of patient/family/discharge partner  - Complete POLST form as appropriate  - Assess patient's ability to be responsible for managing their own health  - Refer to Case Management Department for coordinating discharge planning if the patient needs post-hospital services based on physician/LIP order or complex needs related to functional status, cognitive ability or social support system  Outcome: Progressing     Problem: Altered Communication/Language Barrier  Goal: Patient/Family is able to understand and participate in their care  Description: Interventions:  - Assess communication ability and preferred communication style  - Implement communication aides and strategies  - Use visual cues when possible  - Listen attentively, be patient, do not interrupt  - Minimize distractions  - Allow time for understanding and response  - Establish method for patient to ask for  assistance (call light)  - Provide an  as needed  - Communicate barriers and strategies to overcome with those who interact with patient  Outcome: Progressing

## 2024-10-15 NOTE — CONSULTS
89 y/o male who converted to afib. Now converted back to sinus rhythm Due to age not a great AC candidate. EKG reviewed. Admitted for pneumonia. Given metoprolol.   Vitals:    10/15/24 1204   BP: 132/78   Pulse:    Resp:    Temp:      Recent Labs   Lab 10/11/24  0049 10/12/24  0717   * 105*   BUN 37* 27*   CREATSERUM 1.32* 1.07   EGFRCR 48* 62   CA 9.0 8.4*    138   K 5.4* 4.5    107   CO2 29.0 26.0     Recent Labs   Lab 10/12/24  0717 10/13/24  0735 10/14/24  0429   RBC 3.23* 3.71* 3.31*   HGB 9.5* 10.9* 9.6*   HCT 30.9* 34.0* 30.3*   MCV 95.7 91.6 91.5   MCH 29.4 29.4 29.0   MCHC 30.7* 32.1 31.7   RDW 13.3 13.2 13.1   NEPRELIM 7.84* 8.24* 4.95   WBC 10.3 11.2* 7.9   .0 216.0 216.0       Paroxysmal afib with RVR. Now back in sinus rhythm. Start beta blocker. Echo.    Full consult to follow.

## 2024-10-15 NOTE — PROGRESS NOTES
[facility]  part of [facility]    Progress Note    Ayla Velez Patient Status:  Inpatient    1923 MRN [id number]   Location Health system 4W/SW/SE Attending Ayla Swann MD   Hosp Day # 4 PCP AYLA SWANN MD, MD     Subjective:     Constitutional:  Negative for fever.        A Fib today and converted back to sinus.   HENT:  Negative for ear pain and sinus pressure.    Respiratory:  Negative for choking and shortness of breath.    Cardiovascular:  Negative for leg swelling.   Gastrointestinal:  Negative for diarrhea.   Genitourinary:  Negative for flank pain.   Neurological:  Negative for light-headedness.       Objective:   Blood pressure 132/78, pulse 99, temperature 98 °F (36.7 °C), temperature source Axillary, resp. rate 18, weight 107 lb 14.4 oz (48.9 kg), SpO2 96%.  Physical Exam  Vitals reviewed. Exam conducted with a chaperone present.   Constitutional:       General: He is not in acute distress.     Appearance: He is normal weight.   HENT:      Head: Normocephalic.   Eyes:      Extraocular Movements: Extraocular movements intact.   Cardiovascular:      Rate and Rhythm: Normal rate.      Heart sounds: No murmur heard.  Pulmonary:      Breath sounds: No wheezing.   Abdominal:      General: There is no distension.   Musculoskeletal:         General: Normal range of motion.      Cervical back: Neck supple.   Neurological:      General: No focal deficit present.      Mental Status: He is alert and oriented to person, place, and time.       Results:     Lab Results   Component Value Date    WBC 7.9 10/14/2024    HGB 9.6 (L) 10/14/2024    HCT 30.3 (L) 10/14/2024    .0 10/14/2024    CREATSERUM 1.07 10/12/2024    BUN 27 (H) 10/12/2024     10/12/2024    K 4.5 10/12/2024     10/12/2024    CO2 26.0 10/12/2024     (H) 10/12/2024    CA 8.4 (L) 10/12/2024    ALB 3.6 10/12/2024    ALKPHO 72 10/12/2024    BILT 0.5 10/12/2024    TP 6.0 10/12/2024     AST 15 10/12/2024    ALT 10 10/12/2024    .0 (H) 12/09/2023    INR 1.00 12/09/2023    T4F 1.1 02/08/2024    TSH 3.219 02/08/2024    MG 1.8 11/20/2023       No results found.        Assessment and Plan:   Principal Problem:    Fever, unspecified fever cause  Active Problems:    Community acquired pneumonia of right lower lobe of lung    Leukocytosis    Azotemia    Hyperkalemia    Hyperglycemia  UTI  Left pneumonia.  P A Fib, converted to sinus       PLAN    Tele.  Iv rocephine.  Azithromycin iv.  Consult=cardiio,pulmonary.  Home tomorrow.  PT eval  Dw son.  Aspirin 325mg for dvt proph.  Labs in am.  Metoprolol 12.5 mg po bid.  Scd both legs.      CAROLYN OROURKE MD, MD  10/11/2024

## 2024-10-15 NOTE — CM/SW NOTE
ZA followed up on DC planning.     ZA met with pt and son at bedside to discuss DC planning    Son confirms plan is for home and they already have home PT seeing the pt at home prior to admission and son confirms he will reinstate sessions at discharge    No needs from SW at this time, son confirms he can assist pt at home    SW/CM to remain available for support and/or discharge planning.     PLAN: home, with son    Kyra VAUGHANVeniceCARTER LSW, MSW ext. [phone]

## 2024-10-15 NOTE — PHYSICAL THERAPY NOTE
PHYSICAL THERAPY TREATMENT NOTE - INPATIENT     Room Number: 404/404-A       Presenting Problem: PNA  Co-Morbidities : Hx HTN, asthma    Problem List  Principal Problem:    Fever, unspecified fever cause  Active Problems:    Community acquired pneumonia of right lower lobe of lung    Leukocytosis    Azotemia    Hyperkalemia    Hyperglycemia      PHYSICAL THERAPY ASSESSMENT   Patient demonstrates excellent progress this session, goals  progressing with 2/5 met this session.      Patient is requiring contact guard assist and minimal assist as a result of the following impairments: decreased functional strength, impaired standing dynamic balance, and medical status.     Patient continues to function below baseline with bed mobility, gait, and stair negotiation.  Next session anticipate patient to progress bed mobility, transfers, and gait.  Physical Therapy will continue to follow patient for duration of hospitalization.    Patient continues to benefit from continued skilled PT services: at discharge to promote prior level of function and safety with additional support and return home with home health PT.    PLAN DURING HOSPITALIZATION  Nursing Mobility Recommendation : 1 Assist  PT Device Recommendation: Rolling walker;Mechanical lift;Wheelchair  PT Treatment Plan: Bed mobility;Body mechanics;Endurance;Coordination;Energy conservation;Patient education;Gait training;Family education;Balance training;Transfer training;Strengthening;Stair training  Frequency (Obs): 3-5x/week     SUBJECTIVE  Pt agreeable to session with encouragement from son who was interpreting.    OBJECTIVE  Precautions:  needed (Italian-speaking)    WEIGHT BEARING RESTRICTION  none    PAIN ASSESSMENT   Ratin  Location: denies  Management Techniques: Body mechanics;Repositioning    BALANCE  Static Sitting: Good  Dynamic Sitting: Fair +  Static Standing: Fair  Dynamic Standing: Fair -    ACTIVITY TOLERANCE  Pulse: 99 (at rest, telemetry  notification Afib while ambulating, recovered when returned to bed)                         AM-PAC '6-Clicks' INPATIENT SHORT FORM - BASIC MOBILITY  How much difficulty does the patient currently have...  Patient Difficulty: Turning over in bed (including adjusting bedclothes, sheets and blankets)?: A Little   Patient Difficulty: Sitting down on and standing up from a chair with arms (e.g., wheelchair, bedside commode, etc.): A Little   Patient Difficulty: Moving from lying on back to sitting on the side of the bed?: A Little   How much help from another person does the patient currently need...   Help from Another: Moving to and from a bed to a chair (including a wheelchair)?: A Little   Help from Another: Need to walk in hospital room?: A Little   Help from Another: Climbing 3-5 steps with a railing?: A Lot     AM-PAC Score:  Raw Score: 17   Approx Degree of Impairment: 50.57%   Standardized Score (AM-PAC Scale): 42.13   CMS Modifier (G-Code): CK    FUNCTIONAL ABILITY STATUS  Functional Mobility/Gait Assessment  Gait Assistance: Contact guard assist;Minimum assistance  Distance (ft): 110  Assistive Device: Rolling walker  Pattern: R Flexed knee (kyphotic posture, R knee varus with audible crepitus, wide turn radius, increased postural sway with intermittent Yvette provided for steadying otherwise CGA)  Rolling: minimal assist  Supine to Sit: minimal assist  Sit to Supine: moderate assist  Sit to Stand: contact guard assist    Skilled Therapy Provided: Since previous session patient has progressed, tolerating household distances of gait using rolling walker. Pt demonstrates improved command following and ability to participate compared to previous sessions. Pt's son present to interpret Danish, answering questions on behalf of patient. Pt's son reporting family plan on taking care of patient at home and interested in home-health PT.    Patient received semi-fowlers in bed, agreeable to physical therapy. Vital signs  monitored as noted above, patient experiencing run of Afib during ambulation, RN and telemetry aware, asymptomatic . Anticipated therapy needs have decreased based on patient's functional progress.    PATIENT EDUCATION  Education Provided To: Family/Caregiver  Patient Education: Role of Physical Therapy;Discharge Recommendations;Fall Prevention;Energy Conservation;Proper Body Mechanics;Gait Training  Patient's Response to Education: Requires Further Education;Demonstrates Poor Carry Over to Information;Does Not Demonstrate Skills Needed for Learning          The patient's Approx Degree of Impairment: 50.57% has been calculated based on documentation in the [facility] '6 clicks' Inpatient Daily Activity Short Form.  Research supports that patients with this level of impairment may benefit from home-health PT.  Final disposition will be made by interdisciplinary medical team.    Patient End of Session: In bed;Needs met;RN aware of session/findings;Call light within reach;All patient questions and concerns addressed;Hospital anti-slip socks;Family present    CURRENT GOALS   Goals to be met by: 11/11/24  Patient Goal Patient's self-stated goal is: return to PLOF   Goal #1 Patient is able to demonstrate supine - sit EOB @ level: supervision      Goal #1   Current Status  NOT MET/ IN PROGRESS   Goal #2 Patient is able to demonstrate transfers EOB to/from C at assistance level: minimum assistance with walker - rolling      Goal #2  Current Status MET 10/15/24   Goal #3 Patient is able to ambulate 50 feet with assist device: walker - rolling at assistance level: minimum assistance   Goal #3   Current Status MET 10/15/24   Goal #4 Patient will negotiate 8 stairs/one curb w/ assistive device and minimum assistance    Goal #4   Current Status  NOT MET/ IN PROGRESS   Goal #5 Patient to demonstrate independence with home activity/exercise instructions provided to patient in preparation for discharge.   Goal #5   Current Status  NOT  MET/ IN PROGRESS   Goal #6     Goal #6  Current Status       Gait Trainin minutes      Elisa Pagan, PT, DPT  [facility]  Rehab Services - Physical Therapy  [id number]

## 2024-10-16 ENCOUNTER — APPOINTMENT (OUTPATIENT)
Dept: CV DIAGNOSTICS | Facility: HOSPITAL | Age: 89
DRG: 178 | End: 2024-10-16
Attending: INTERNAL MEDICINE
Payer: MEDICARE

## 2024-10-16 ENCOUNTER — APPOINTMENT (OUTPATIENT)
Dept: CV DIAGNOSTICS | Facility: HOSPITAL | Age: 89
End: 2024-10-16
Attending: INTERNAL MEDICINE
Payer: MEDICARE

## 2024-10-16 VITALS
RESPIRATION RATE: 16 BRPM | OXYGEN SATURATION: 99 % | BODY MASS INDEX: 21 KG/M2 | SYSTOLIC BLOOD PRESSURE: 150 MMHG | HEART RATE: 87 BPM | DIASTOLIC BLOOD PRESSURE: 80 MMHG | TEMPERATURE: 99 F | WEIGHT: 107.88 LBS

## 2024-10-16 LAB
ALBUMIN SERPL-MCNC: 3.7 G/DL (ref 3.2–4.8)
ALBUMIN/GLOB SERPL: 1.3 {RATIO} (ref 1–2)
ALP LIVER SERPL-CCNC: 80 U/L
ALT SERPL-CCNC: 14 U/L
ANION GAP SERPL CALC-SCNC: 5 MMOL/L (ref 0–18)
AST SERPL-CCNC: 20 U/L (ref ?–34)
BASOPHILS # BLD AUTO: 0.04 X10(3) UL (ref 0–0.2)
BASOPHILS NFR BLD AUTO: 0.5 %
BILIRUB SERPL-MCNC: 0.3 MG/DL (ref 0.2–0.9)
BUN BLD-MCNC: 20 MG/DL (ref 9–23)
BUN/CREAT SERPL: 23.8 (ref 10–20)
CALCIUM BLD-MCNC: 9.3 MG/DL (ref 8.7–10.4)
CHLORIDE SERPL-SCNC: 106 MMOL/L (ref 98–112)
CO2 SERPL-SCNC: 28 MMOL/L (ref 21–32)
CREAT BLD-MCNC: 0.84 MG/DL
DEPRECATED RDW RBC AUTO: 42.8 FL (ref 35.1–46.3)
EGFRCR SERPLBLD CKD-EPI 2021: 77 ML/MIN/1.73M2 (ref 60–?)
EOSINOPHIL # BLD AUTO: 0.51 X10(3) UL (ref 0–0.7)
EOSINOPHIL NFR BLD AUTO: 6.3 %
ERYTHROCYTE [DISTWIDTH] IN BLOOD BY AUTOMATED COUNT: 13 % (ref 11–15)
GLOBULIN PLAS-MCNC: 2.8 G/DL (ref 2–3.5)
GLUCOSE BLD-MCNC: 92 MG/DL (ref 70–99)
HCT VFR BLD AUTO: 31.9 %
HGB BLD-MCNC: 10.3 G/DL
IMM GRANULOCYTES # BLD AUTO: 0.04 X10(3) UL (ref 0–1)
IMM GRANULOCYTES NFR BLD: 0.5 %
LYMPHOCYTES # BLD AUTO: 1.7 X10(3) UL (ref 1–4)
LYMPHOCYTES NFR BLD AUTO: 20.9 %
MCH RBC QN AUTO: 29 PG (ref 26–34)
MCHC RBC AUTO-ENTMCNC: 32.3 G/DL (ref 31–37)
MCV RBC AUTO: 89.9 FL
MONOCYTES # BLD AUTO: 0.97 X10(3) UL (ref 0.1–1)
MONOCYTES NFR BLD AUTO: 11.9 %
NEUTROPHILS # BLD AUTO: 4.89 X10 (3) UL (ref 1.5–7.7)
NEUTROPHILS # BLD AUTO: 4.89 X10(3) UL (ref 1.5–7.7)
NEUTROPHILS NFR BLD AUTO: 59.9 %
OSMOLALITY SERPL CALC.SUM OF ELEC: 290 MOSM/KG (ref 275–295)
PLATELET # BLD AUTO: 229 10(3)UL (ref 150–450)
POTASSIUM SERPL-SCNC: 4.6 MMOL/L (ref 3.5–5.1)
PROT SERPL-MCNC: 6.5 G/DL (ref 5.7–8.2)
RBC # BLD AUTO: 3.55 X10(6)UL
SODIUM SERPL-SCNC: 139 MMOL/L (ref 136–145)
WBC # BLD AUTO: 8.2 X10(3) UL (ref 4–11)

## 2024-10-16 PROCEDURE — 99232 SBSQ HOSP IP/OBS MODERATE 35: CPT | Performed by: INTERNAL MEDICINE

## 2024-10-16 PROCEDURE — 93306 TTE W/DOPPLER COMPLETE: CPT | Performed by: INTERNAL MEDICINE

## 2024-10-16 RX ORDER — FLUTICASONE PROPIONATE AND SALMETEROL 250; 50 UG/1; UG/1
1 POWDER RESPIRATORY (INHALATION) 2 TIMES DAILY
Qty: 1 EACH | Refills: 0 | Status: SHIPPED
Start: 2024-10-16

## 2024-10-16 RX ORDER — MONTELUKAST SODIUM 10 MG/1
10 TABLET ORAL NIGHTLY
Qty: 30 TABLET | Refills: 1 | Status: SHIPPED
Start: 2024-10-16

## 2024-10-16 RX ORDER — AMOXICILLIN AND CLAVULANATE POTASSIUM 500; 125 MG/1; MG/1
1 TABLET, FILM COATED ORAL 2 TIMES DAILY
Qty: 10 TABLET | Refills: 0 | Status: SHIPPED | OUTPATIENT
Start: 2024-10-16

## 2024-10-16 RX ORDER — METOPROLOL TARTRATE 25 MG/1
12.5 TABLET, FILM COATED ORAL 2 TIMES DAILY PRN
Qty: 60 TABLET | Refills: 0 | Status: SHIPPED
Start: 2024-10-16

## 2024-10-16 RX ORDER — AMLODIPINE BESYLATE 5 MG/1
5 TABLET ORAL 2 TIMES DAILY
Status: DISCONTINUED | OUTPATIENT
Start: 2024-10-16 | End: 2024-10-16

## 2024-10-16 RX ORDER — ALBUTEROL SULFATE 0.83 MG/ML
2.5 SOLUTION RESPIRATORY (INHALATION) EVERY 6 HOURS PRN
Qty: 3 ML | Refills: 0 | Status: SHIPPED
Start: 2024-10-16

## 2024-10-16 NOTE — PROGRESS NOTES
[facility]  part of [facility]    Progress Note - Late entry for 10/15/24      Assessment and Plan:   1.  Pneumonia-left greater than right, probably aspiration  Since he is 89 years old, and the x-ray had worsened with significant basilar infiltrates.  The patient appeared well enough to go home yesterday until he developed atrial fibrillation with fast response.  His respiratory status appears improved.    Recommendations:  1.  Would anticipate discharge home tomorrow  2.  At discharge, can transition to 7 days of Augmentin  3.  Will follow clinically  4.  Ongoing swallowing strategies    2.  Asthma-continue current inhaler usage    3.  DVT prophylaxis-SCDs    4.  CODE STATUS-full reported    5.  Atrial fibrillation-as per cardiology      Subjective:   Ayla Velez is a(n) 89 year old male who is breathing more comfortably    Objective:   Blood pressure 151/75, pulse 89, temperature 97.8 °F (36.6 °C), temperature source Oral, resp. rate 16, weight 107 lb 14.4 oz (48.9 kg), SpO2 96%.    Physical Exam alert elderly male  HEENT examination is unremarkable with pupils equal round and reactive to light and accommodation.   Neck without adenopathy, thyromegaly, JVD nor bruit.   Lungs diminished with a few basilar crackles to auscultation and percussion.  Cardiac regular rate and rhythm no murmur.   Abdomen nontender, without hepatosplenomegaly and no mass appreciable.   Extremities without clubbing cyanosis nor edema.   Neurologic grossly intact with symmetric tone and strength and reflex.  Skin without gross abnormality     Results:     Lab Results   Component Value Date    WBC 8.2 10/16/2024    HGB 10.3 10/16/2024    HCT 31.9 10/16/2024    .0 10/16/2024    CREATSERUM 0.84 10/16/2024    BUN 20 10/16/2024     10/16/2024    K 4.6 10/16/2024     10/16/2024    CO2 28.0 10/16/2024    GLU 92 10/16/2024    CA 9.3 10/16/2024    ALB 3.7 10/16/2024    ALKPHO 80 10/16/2024     BILT 0.3 10/16/2024    TP 6.5 10/16/2024    AST 20 10/16/2024    ALT 14 10/16/2024       Cheng Christiansen MD  Medical Director, Critical Care, [facility]  Medical Director, [facility]  Pager: [phone]

## 2024-10-16 NOTE — PROGRESS NOTES
HPI  89 year old gentleman seen in cardiology consultation due to rapid atrial fibrillation. Patient had paroxysmal atrial fibrillation with upper ventricle response. He subsequently converted to sinus rhythm. Patient has been admitted with aspiration pneumonia. Patient had 4 beats of ventricular tachycardia. Last night, he also had a short episode of atrial flutter with a heart rate of 122 beats per minute.    Past medical history: Hypertension, asthma, history of tongue cancer  Allergies: None  Social history: Non-smoker, no alcohol use  Cardiac medications: Aspirin 81 mg daily, Amlodipine 2.5 mg daily, Metoprolol tartrate 12.5 mg as needed (takes every few weeks per son), Lisinopril 5 mg daily  ROS  Patient currently denies any chest pain, heaviness in the chest, shortness of breath.  Physical Exam  Vitals: BP: 151/75 HR: 89    General: No acute distress.  Weak appearing.  HEENT: No scleral icterus.  External ears are normal.  Lids are normal.  Oral mucosa is moist.  Neck: No JVD, no bruits.  Cardiac: Normal rate, systolic murmur at the apex.  Lungs: Clear without rhales, rhochi or wheezing.  Abdomen: Soft, non-tender. No abdominal bruit. No hepatojugular reflux.  Extremities: No edema.    Neurologic: Alert and moving all 4 extremities.  Psychiatry: Patient has calm affect.  Lymphatic: No cervical or lower extremity lymphadenopathy.  Skin/nails: No clubbing.  Musculoskeletal: No joint effusions.    Pertinent diagnostic findings: Telemetry demonstrating brief episodes of atrial flutter, less than 10 seconds. Creatinine 0.84. . Chest X-ray: left lower lobe consolidation.    Assessment / Plan  1. Brief episodes of atrial flutter with upper ventricular response -  Likely brought on by patient's activity yesterday and in the setting of infection. Patient is very advanced stage for anticoagulation, and these episodes are very short as well.    2. Aspiration pneumonia -    3. Elevated BNP -      Recommendations:    - Continue with metoprolol 25 mg BID.  - Continue with amlodipine 2.5 mg daily for hypertension.   - Patient did come in with acute renal failure (creatinine at 1.32 on initial admission) that has improved.   - His blood pressure since yesterday has been elevated. Instead of resuming lisinopril, we will increase amlodipine to 5 mg daily for blood pressure control.  - Patient could discharge from cardiology standpoint today.    Discussion Notes  Son states that he gives patient metoprolol 12.5 mg every few weeks.  We will see how he does with his current regimen I did explain that he may have higher needs for beta-blocker while in the hospital and being treated for infection.    Christ Nuñez DO [facility]  [address]  [place] [address]  [phone]

## 2024-10-16 NOTE — PLAN OF CARE
Patient Alert and Oriented x3-4. Speaks Maori mainly. On pacemaker. Red urine output, notified MD. Pureed, no straws. Meds crushed with applesauce. Fall precautions in place. Call light and personal belongings within arms reach.   Problem: Patient Centered Care  Goal: Patient preferences are identified and integrated in the patient's plan of care  Description: Interventions:  - What would you like us to know as we care for you? From home with family  - Provide timely, complete, and accurate information to patient/family  - Incorporate patient and family knowledge, values, beliefs, and cultural backgrounds into the planning and delivery of care  - Encourage patient/family to participate in care and decision-making at the level they choose  - Honor patient and family perspectives and choices  Outcome: Progressing     Problem: PAIN - ADULT  Goal: Verbalizes/displays adequate comfort level or patient's stated pain goal  Description: INTERVENTIONS:  - Encourage pt to monitor pain and request assistance  - Assess pain using appropriate pain scale  - Administer analgesics based on type and severity of pain and evaluate response  - Implement non-pharmacological measures as appropriate and evaluate response  - Consider cultural and social influences on pain and pain management  - Manage/alleviate anxiety  - Utilize distraction and/or relaxation techniques  - Monitor for opioid side effects  - Notify MD/LIP if interventions unsuccessful or patient reports new pain  - Anticipate increased pain with activity and pre-medicate as appropriate  Outcome: Progressing     Problem: RISK FOR INFECTION - ADULT  Goal: Absence of fever/infection during anticipated neutropenic period  Description: INTERVENTIONS  - Monitor WBC  - Administer growth factors as ordered  - Implement neutropenic guidelines  Outcome: Progressing     Problem: SAFETY ADULT - FALL  Goal: Free from fall injury  Description: INTERVENTIONS:  - Assess pt frequently for  physical needs  - Identify cognitive and physical deficits and behaviors that affect risk of falls.  - [place] fall precautions as indicated by assessment.  - Educate pt/family on patient safety including physical limitations  - Instruct pt to call for assistance with activity based on assessment  - Modify environment to reduce risk of injury  - Provide assistive devices as appropriate  - Consider OT/PT consult to assist with strengthening/mobility  - Encourage toileting schedule  Outcome: Progressing

## 2024-10-16 NOTE — PROGRESS NOTES
[facility]  part of [facility]    Progress Note    Ayla Velez Patient Status:  Inpatient    1923 MRN [id number]   Location [facility] 4W/SW/SE Attending Ayla Swann MD   Hosp Day # 5 PCP AYLA SWANN MD, MD     Subjective:     Constitutional:  Negative for fever.        No complains today.   HENT:  Negative for ear pain and sinus pressure.    Respiratory:  Negative for choking and shortness of breath.    Cardiovascular:  Negative for leg swelling.   Gastrointestinal:  Negative for diarrhea.   Genitourinary:  Negative for flank pain.   Neurological:  Negative for light-headedness.       Objective:   Blood pressure 150/80, pulse 89, temperature 98.7 °F (37.1 °C), temperature source Oral, resp. rate 16, weight 107 lb 14.4 oz (48.9 kg), SpO2 99%.  Physical Exam  Vitals reviewed. Exam conducted with a chaperone present.   Constitutional:       General: He is not in acute distress.     Appearance: He is normal weight.   HENT:      Head: Normocephalic.   Eyes:      Extraocular Movements: Extraocular movements intact.   Cardiovascular:      Rate and Rhythm: Normal rate.      Heart sounds: No murmur heard.  Pulmonary:      Breath sounds: No wheezing.   Abdominal:      General: There is no distension.   Musculoskeletal:         General: Normal range of motion.      Cervical back: Neck supple.   Neurological:      General: No focal deficit present.      Mental Status: He is alert and oriented to person, place, and time.         Results:     Lab Results   Component Value Date    WBC 8.2 10/16/2024    HGB 10.3 (L) 10/16/2024    HCT 31.9 (L) 10/16/2024    .0 10/16/2024    CREATSERUM 0.84 10/16/2024    BUN 20 10/16/2024     10/16/2024    K 4.6 10/16/2024     10/16/2024    CO2 28.0 10/16/2024    GLU 92 10/16/2024    CA 9.3 10/16/2024    ALB 3.7 10/16/2024    ALKPHO 80 10/16/2024    BILT 0.3 10/16/2024    TP 6.5 10/16/2024    AST 20 10/16/2024    ALT 14  10/16/2024    .0 (H) 12/09/2023    INR 1.00 12/09/2023    T4F 1.0 10/14/2024    TSH 1.830 10/14/2024    MG 1.8 11/20/2023       No results found.        Assessment and Plan:   Principal Problem:    Fever, unspecified fever cause  Active Problems:    Community acquired pneumonia of right lower lobe of lung    Leukocytosis    Azotemia    Hyperkalemia    Hyperglycemia  UTI, stable.  Pneumonia, stable.  Dysphagia to liquids  P A Fib, converted to sinus       PLAN    Tele.  Home today  Consultants agreed.    CAROLYN OROURKE MD, MD.  10/11/2024

## 2024-10-16 NOTE — PROGRESS NOTES
[facility]  part of [facility]    Progress Note       Assessment and Plan:   1.  Pneumonia-left greater than right, probably aspiration  Since he is 89 years old, and the x-ray had worsened with significant basilar infiltrates.  The patient appeared well enough to go home yesterday until he developed atrial fibrillation with fast response.  His respiratory status appears improved.    The patient's son tells me that the patient chokes on water with coughing with meals but not so much with solids.  The patient was reevaluated by speech-language pathology and mild thickening of liquids.  She is now recommended, nectar thick.    Recommendations:  1.  Would anticipate discharge home today with nectar thickening of liquids  2.  At discharge, can transition to 5 days of Augmentin  3.  Will follow clinically  4.  Ongoing swallowing strategies    2.  Asthma-continue current inhaler usage    3.  DVT prophylaxis-SCDs    4.  CODE STATUS-full reported    5.  Atrial fibrillation-as per cardiology      Subjective:   Ayla Velez is a(n) 89 year old male who is breathing more comfortably    Objective:   Blood pressure 150/80, pulse 89, temperature 98.7 °F (37.1 °C), temperature source Oral, resp. rate 16, weight 107 lb 14.4 oz (48.9 kg), SpO2 99%.    Physical Exam alert elderly male  HEENT examination is unremarkable with pupils equal round and reactive to light and accommodation.   Neck without adenopathy, thyromegaly, JVD nor bruit.   Lungs diminished with a few basilar crackles to auscultation and percussion.  Cardiac regular rate and rhythm no murmur.   Abdomen nontender, without hepatosplenomegaly and no mass appreciable.   Extremities without clubbing cyanosis nor edema.   Neurologic grossly intact with symmetric tone and strength and reflex.  Skin without gross abnormality     Results:     Lab Results   Component Value Date    WBC 8.2 10/16/2024    HGB 10.3 10/16/2024    HCT 31.9 10/16/2024     .0 10/16/2024    CREATSERUM 0.84 10/16/2024    BUN 20 10/16/2024     10/16/2024    K 4.6 10/16/2024     10/16/2024    CO2 28.0 10/16/2024    GLU 92 10/16/2024    CA 9.3 10/16/2024    ALB 3.7 10/16/2024    ALKPHO 80 10/16/2024    BILT 0.3 10/16/2024    TP 6.5 10/16/2024    AST 20 10/16/2024    ALT 14 10/16/2024       Cheng Christiansen MD  Medical Director, Critical Care, [facility]  Medical Director, [facility]  Pager: [phone]

## 2024-10-16 NOTE — PLAN OF CARE
Patient alert and oriented. Mainly Upper sor[place] speaking, family at bedside to translate. Ambulating standby with walker. Receiving IV abx. Primofit in place. UA completed, results noted. Mepilex to sacrum.   Echo and speech re-eval completed today. Pt tolerating mildly thick liquids.  Cleared for discharge by pulm, cardiology, SLP and internal medicine.    Going home with oral abx. Discharge instructions provided, all questions answered.    Problem: Patient Centered Care  Goal: Patient preferences are identified and integrated in the patient's plan of care  Description: Interventions:  - What would you like us to know as we care for you? From home with family  - Provide timely, complete, and accurate information to patient/family  - Incorporate patient and family knowledge, values, beliefs, and cultural backgrounds into the planning and delivery of care  - Encourage patient/family to participate in care and decision-making at the level they choose  - Honor patient and family perspectives and choices  Outcome: Adequate for Discharge     Problem: PAIN - ADULT  Goal: Verbalizes/displays adequate comfort level or patient's stated pain goal  Description: INTERVENTIONS:  - Encourage pt to monitor pain and request assistance  - Assess pain using appropriate pain scale  - Administer analgesics based on type and severity of pain and evaluate response  - Implement non-pharmacological measures as appropriate and evaluate response  - Consider cultural and social influences on pain and pain management  - Manage/alleviate anxiety  - Utilize distraction and/or relaxation techniques  - Monitor for opioid side effects  - Notify MD/LIP if interventions unsuccessful or patient reports new pain  - Anticipate increased pain with activity and pre-medicate as appropriate  Outcome: Adequate for Discharge     Problem: RISK FOR INFECTION - ADULT  Goal: Absence of fever/infection during anticipated neutropenic period  Description: INTERVENTIONS  -  Monitor WBC  - Administer growth factors as ordered  - Implement neutropenic guidelines  Outcome: Adequate for Discharge     Problem: SAFETY ADULT - FALL  Goal: Free from fall injury  Description: INTERVENTIONS:  - Assess pt frequently for physical needs  - Identify cognitive and physical deficits and behaviors that affect risk of falls.  - [place] fall precautions as indicated by assessment.  - Educate pt/family on patient safety including physical limitations  - Instruct pt to call for assistance with activity based on assessment  - Modify environment to reduce risk of injury  - Provide assistive devices as appropriate  - Consider OT/PT consult to assist with strengthening/mobility  - Encourage toileting schedule  Outcome: Adequate for Discharge     Problem: DISCHARGE PLANNING  Goal: Discharge to home or other facility with appropriate resources  Description: INTERVENTIONS:  - Identify barriers to discharge w/pt and caregiver  - Include patient/family/discharge partner in discharge planning  - Arrange for needed discharge resources and transportation as appropriate  - Identify discharge learning needs (meds, wound care, etc)  - Arrange for interpreters to assist at discharge as needed  - Consider post-discharge preferences of patient/family/discharge partner  - Complete POLST form as appropriate  - Assess patient's ability to be responsible for managing their own health  - Refer to Case Management Department for coordinating discharge planning if the patient needs post-hospital services based on physician/LIP order or complex needs related to functional status, cognitive ability or social support system  Outcome: Adequate for Discharge     Problem: Altered Communication/Language Barrier  Goal: Patient/Family is able to understand and participate in their care  Description: Interventions:  - Assess communication ability and preferred communication style  - Implement communication aides and strategies  - Use  visual cues when possible  - Listen attentively, be patient, do not interrupt  - Minimize distractions  - Allow time for understanding and response  - Establish method for patient to ask for assistance (call light)  - Provide an  as needed  - Communicate barriers and strategies to overcome with those who interact with patient  Outcome: Adequate for Discharge

## 2024-10-16 NOTE — SLP NOTE
ADULT SWALLOWING EVALUATION    ASSESSMENT    ASSESSMENT/OVERALL IMPRESSION:  PPE REQUIRED. THIS THERAPIST WORE GLOVES, DROPLET MASK, AND GOGGLES FOR DURATION OF EVALUATION. HANDS WASHED UPON ENTRANCE/EXIT.    SLP BSSE orders received and acknowledged. A swallow evaluation warranted as coughing with thin liquids per pt's son. Pt's son reports that he only coughs on liquids in the hospital because of the position of the bed. Pt afebrile with clear vocal quality, on room air, with oxygen saturation at 97. Pt re-positioned upright in bed with son at bedside. Pt with no complaints of pain, RN aware. Pt with adequate oral acceptance and bilabial seal across all trials. Pt with increased CHARITY. Pt's swallow response appears delayed with reduced hyolaryngeal elevation/excursion. No clinical signs of aspiration (e.g., immediate/delayed throat clear, immediate/delayed cough, wet vocal quality, increased O2 effort) observed across all trials of puree. Overt signs/symptoms of aspiration observed with thin liquids as evidenced by wet vocal quality and weak throat clearing. Mild throat clear x1 observed with mildly thick liquids, however, improved with remainder of trials. Oral/buccal cavities clear of residue following all trials. Oxygen status remained >96 t/o the entire evaluation.     At this time, pt presents with mild oral dysphagia and probable pharyngeal dysfunction. Recommend a puree diet and mildly thick liquids with strict adherence to safe swallowing compensatory strategies. Results and recommendations reviewed with RN, pt, and family. Pt/pt's family v/u to all results/recommendations. Recommendations remain written on whiteboard. SLP collaborated with RN for MD diet orders.     PLAN: SLP to f/u x1 meal assessment, monitor CXR, and VFSS if clinically warranted.       RECOMMENDATIONS   Diet Recommendations - Solids: Puree  Diet Recommendations - Liquids: Nectar thick liquids/ Mildly thick      Compensatory Strategies  Recommended: No straws;Slow rate;Alternate consistencies;Small bites and sips  Aspiration Precautions: Upright position;Slow rate;Small bites and sips;No straw  Medication Administration Recommendations:  (as tolerated)  Treatment Plan/Recommendations: Aspiration precautions    HISTORY   MEDICAL HISTORY  Reason for Referral:  (coughing with thin per son)    Problem List  Principal Problem:    Fever, unspecified fever cause  Active Problems:    Community acquired pneumonia of right lower lobe of lung    Leukocytosis    Azotemia    Hyperkalemia    Hyperglycemia      Past Medical History  Past Medical History:    Asthma (HCC)    Cancer (HCC)    Essential hypertension       Prior Living Situation: Home with support  Diet Prior to Admission: Puree;Thin liquids  Precautions: None    Patient/Family Goals:   VFSS 10/12/17: soft/thin  BSE 11/17/23: puree/thin liquids  BSE 2/8/24: puree/moderately thick liquids      SWALLOWING HISTORY  Current Diet Consistency: Puree;Thin liquids  Dysphagia History:     Imaging Results:     CXR 10/13/24:  CONCLUSION:   1. Developing left lower lobe consolidation, which could relate to atelectasis and/or pneumonia.   2. Otherwise stable mild scarring in the mid to lower lungs bilaterally.            Dictated by (CST): Noam Jean MD on 10/13/2024 at 2:17 PM       Finalized by (CST): Noam Jean MD on 10/13/2024 at 2:19 PM     CT BRAIN 10/11/24:  CONCLUSION:      No acute intracranial abnormality.      Chronic microvascular white matter ischemia.  Left basal ganglia lacunar infarct.      A preliminary report was issued by the [facility] teleradiology service. There are no major discrepancies.            Dictated by (CST): Gabe Bruce MD on 10/11/2024 at 8:52 AM       Finalized by (CST): Gabe Bruce MD on 10/11/2024 at 8:55 AM       OBJECTIVE   ORAL MOTOR EXAMINATION     Symmetry: Unable to assess  Strength:  (reduced)     Range of Motion:  (reduced)  Rate of  Motion: Reduced    Voice Quality: Clear  Respiratory Status: Unlabored  Consistencies Trialed: Thin liquids;Nectar thick liquids;Puree  Method of Presentation: Self presentation;Staff/Clinician assistance;Spoon;Cup  Patient Positioning: Upright;Midline    Oral Phase of Swallow: Impaired  Bolus Retrieval: Intact  Bilabial Seal: Intact  Bolus Formation: Impaired  Bolus Propulsion: Impaired     Retention: Impaired    Pharyngeal Phase of Swallow: Impaired  Laryngeal Elevation Timing: Appears impaired  Laryngeal Elevation Strength: Appears impaired  Laryngeal Elevation Coordination: Appears impaired  (Please note: Silent aspiration cannot be evaluated clinically. Videofluoroscopic Swallow Study is required to rule-out silent aspiration.)    Esophageal Phase of Swallow: No complaints consistent with possible esophageal involvement    GOALS  Goal #1 The patient will tolerate puree consistency and mildly thick liquids without overt signs or symptoms of aspiration with 100 % accuracy over 1 session(s).  In Progress   Goal #2 The patient/family/caregiver will demonstrate understanding and implementation of aspiration precautions and swallow strategies independently over 1 session(s).    In Progress   Goal #3 The patient will utilize compensatory strategies as outlined by  BSSE (clinical evaluation) including Slow rate, Small bites, Small sips, No straws, Upright 90 degrees, Eliminate distractions, Supervision with meals with PRN assistance 100 % of the time across 1 sessions.  In Progress     FOLLOW UP  Treatment Plan/Recommendations: Aspiration precautions  Number of Visits to Meet Established Goals: 1  Follow Up Needed (Documentation Required): Yes  SLP Follow-up Date: 10/17/24    Thank you for your referral.   If you have any questions, please contact MYLES Altamirano M.S. CCC-SLP  Speech Language Pathologist  Phone Number [phone]

## 2024-10-18 ENCOUNTER — PATIENT OUTREACH (OUTPATIENT)
Dept: CASE MANAGEMENT | Age: 89
End: 2024-10-18

## 2024-10-18 NOTE — PROGRESS NOTES
SCC-Pneumonia appointment request (discharged 10/16)    [facility]  [address]  [place] [address]  [phone]  [address]  Patient son, Ayla advised he will call to schedule the patient's appointments  Closing encounter

## 2025-03-09 ENCOUNTER — HOSPITAL ENCOUNTER (EMERGENCY)
Facility: HOSPITAL | Age: OVER 89
Discharge: HOME | End: 2025-03-10
Attending: EMERGENCY MEDICINE
Payer: MEDICARE

## 2025-03-09 VITALS
BODY MASS INDEX: 21 KG/M2 | TEMPERATURE: 98 F | SYSTOLIC BLOOD PRESSURE: 158 MMHG | HEART RATE: 87 BPM | WEIGHT: 110 LBS | DIASTOLIC BLOOD PRESSURE: 74 MMHG | RESPIRATION RATE: 20 BRPM | OXYGEN SATURATION: 99 %

## 2025-03-09 DIAGNOSIS — R33.9 URINARY RETENTION: Primary | ICD-10-CM

## 2025-03-09 DIAGNOSIS — E87.5 HYPERKALEMIA: ICD-10-CM

## 2025-03-09 DIAGNOSIS — N19 UREMIA: ICD-10-CM

## 2025-03-09 LAB
ANION GAP SERPL CALC-SCNC: 7 MMOL/L (ref 0–18)
BASOPHILS # BLD AUTO: 0.04 X10(3) UL (ref 0–0.2)
BASOPHILS NFR BLD AUTO: 0.5 %
BILIRUB UR QL CFM: NEGATIVE
BUN BLD-MCNC: 34 MG/DL (ref 9–23)
BUN/CREAT SERPL: 32.7 (ref 10–20)
CALCIUM BLD-MCNC: 8.8 MG/DL (ref 8.7–10.4)
CHLORIDE SERPL-SCNC: 97 MMOL/L (ref 98–112)
CO2 SERPL-SCNC: 27 MMOL/L (ref 21–32)
CREAT BLD-MCNC: 1.04 MG/DL
DEPRECATED RDW RBC AUTO: 46.7 FL (ref 35.1–46.3)
EGFRCR SERPLBLD CKD-EPI 2021: 64 ML/MIN/1.73M2 (ref 60–?)
EOSINOPHIL # BLD AUTO: 0.07 X10(3) UL (ref 0–0.7)
EOSINOPHIL NFR BLD AUTO: 0.9 %
ERYTHROCYTE [DISTWIDTH] IN BLOOD BY AUTOMATED COUNT: 14 % (ref 11–15)
GLUCOSE BLD-MCNC: 103 MG/DL (ref 70–99)
HCT VFR BLD AUTO: 29.9 %
HGB BLD-MCNC: 9.4 G/DL
IMM GRANULOCYTES # BLD AUTO: 0.03 X10(3) UL (ref 0–1)
IMM GRANULOCYTES NFR BLD: 0.4 %
LYMPHOCYTES # BLD AUTO: 1.2 X10(3) UL (ref 1–4)
LYMPHOCYTES NFR BLD AUTO: 16.2 %
MCH RBC QN AUTO: 28.7 PG (ref 26–34)
MCHC RBC AUTO-ENTMCNC: 31.4 G/DL (ref 31–37)
MCV RBC AUTO: 91.2 FL
MONOCYTES # BLD AUTO: 0.92 X10(3) UL (ref 0.1–1)
MONOCYTES NFR BLD AUTO: 12.4 %
NEUTROPHILS # BLD AUTO: 5.14 X10 (3) UL (ref 1.5–7.7)
NEUTROPHILS # BLD AUTO: 5.14 X10(3) UL (ref 1.5–7.7)
NEUTROPHILS NFR BLD AUTO: 69.6 %
OSMOLALITY SERPL CALC.SUM OF ELEC: 280 MOSM/KG (ref 275–295)
PLATELET # BLD AUTO: 215 10(3)UL (ref 150–450)
POTASSIUM SERPL-SCNC: 5.4 MMOL/L (ref 3.5–5.1)
RBC # BLD AUTO: 3.28 X10(6)UL
RBC #/AREA URNS AUTO: >10 /HPF
RBC #/AREA URNS AUTO: >10 /HPF
SODIUM SERPL-SCNC: 131 MMOL/L (ref 136–145)
SP GR UR STRIP: 1.01 (ref 1–1.03)
WBC # BLD AUTO: 7.4 X10(3) UL (ref 4–11)
WBC #/AREA URNS AUTO: >50 /HPF
WBC #/AREA URNS AUTO: >50 /HPF

## 2025-03-09 PROCEDURE — 51700 IRRIGATION OF BLADDER: CPT

## 2025-03-09 PROCEDURE — 80048 BASIC METABOLIC PNL TOTAL CA: CPT | Performed by: EMERGENCY MEDICINE

## 2025-03-09 PROCEDURE — 99284 EMERGENCY DEPT VISIT MOD MDM: CPT

## 2025-03-09 PROCEDURE — 85025 COMPLETE CBC W/AUTO DIFF WBC: CPT | Performed by: EMERGENCY MEDICINE

## 2025-03-09 PROCEDURE — 51798 US URINE CAPACITY MEASURE: CPT

## 2025-03-09 PROCEDURE — 87086 URINE CULTURE/COLONY COUNT: CPT | Performed by: EMERGENCY MEDICINE

## 2025-03-09 PROCEDURE — 81001 URINALYSIS AUTO W/SCOPE: CPT | Performed by: EMERGENCY MEDICINE

## 2025-03-09 PROCEDURE — 81015 MICROSCOPIC EXAM OF URINE: CPT | Performed by: EMERGENCY MEDICINE

## 2025-03-09 PROCEDURE — 96360 HYDRATION IV INFUSION INIT: CPT

## 2025-03-09 PROCEDURE — 96361 HYDRATE IV INFUSION ADD-ON: CPT

## 2025-03-09 RX ORDER — MAGNESIUM HYDROXIDE 1200 MG/15ML
3000 LIQUID ORAL CONTINUOUS
Status: DISCONTINUED | OUTPATIENT
Start: 2025-03-09 | End: 2025-03-10

## 2025-03-09 RX ORDER — LIDOCAINE HYDROCHLORIDE 20 MG/ML
10 JELLY TOPICAL ONCE
Status: COMPLETED | OUTPATIENT
Start: 2025-03-09 | End: 2025-03-09

## 2025-03-10 ENCOUNTER — TELEPHONE (OUTPATIENT)
Dept: SURGERY | Facility: CLINIC | Age: OVER 89
End: 2025-03-10

## 2025-03-10 RX ORDER — ACETAMINOPHEN 500 MG
1000 TABLET ORAL ONCE
Status: COMPLETED | OUTPATIENT
Start: 2025-03-10 | End: 2025-03-10

## 2025-03-10 NOTE — TELEPHONE ENCOUNTER
Called patient's son, verified name and  of his father. Son says he is concerned about the catheter because it looks like he has blood in the bag and he doesn't know if he did something. I let son know if he is concerned about bleeding he should take patient to ER to get evaluated. I offered son next available appointment with Dr. Fields however son says he will take patient to ER and after that he will call to make an appointment.

## 2025-03-10 NOTE — ED INITIAL ASSESSMENT (HPI)
Family brought pt in for bleeding. Family does not know if its from his rectum or urethra. Family states pt is also acting confused and aggressive. Pt c/o abd pain and constipation but denies pain at this time. Pt takes a baby aspirin a day. Pt denies CP/N/V/D/fever and cough

## 2025-03-10 NOTE — TELEPHONE ENCOUNTER
Patient's son states patient had a catheter placed in ER on 3/9 but sees blood in catheter bag and states he thinks catheter shifted causing pain and bleeding. Per patient's son patient is not currently bleeding. Patient declined appointment with Dr. Fields on 3/24, Dr. Thompson on 3/19, and Dr. Arriaza today. Patient's son states he will take patient back to ER but asking to schedule appointment for after. Please call.

## 2025-03-10 NOTE — TELEPHONE ENCOUNTER
-Call-Center transferred pt's Son to Urology/ CHF; pt identity verified w/ name & .  -Pt is not established in our office; Son wanted Consult appt today so he did not have to take his 89y/o father to the ER.  -On 3/9/25, pt in [place] ER for hematuria; campos placed & bladder irrigated.   -Today Son instructed to take pt to ER for reports of continued hematuria.  -Pt's granddtr (Barbara Velez) joined conversation.  I reviewed results of 3/6/25 hematocrit/  Hgb; needs to be mentioned when they take pt back to ER.  -Per the Son's request pt given appt w/ Dr. Thompson on 3/19/25 @ 10:30am.  -Encounter complete.

## 2025-03-10 NOTE — ED PROVIDER NOTES
Patient Seen in: [facility] Emergency Department    History   No chief complaint on file.      HPI    History is provided by patient/independent historian: patient's family providing translation per patient request, patient  89 year old male with hx of asthma, cancer, HTN, here with c/o increased urinary frequency, hematuria x1 day. No dysuria until now. No flank pain, abdominal pain. Family reports he seems more anxious initially which was controlled with xanax. He takes baby asa daily.    History reviewed.   Past Medical History:    Asthma (HCC)    Cancer (HCC)    Essential hypertension         History reviewed. History reviewed. No pertinent surgical history.      Home Medications reviewed :  Prescriptions Prior to Admission[1]      History reviewed.   Social History     Socioeconomic History    Marital status: Single   Tobacco Use    Smoking status: Never    Smokeless tobacco: Never   Vaping Use    Vaping status: Never Used   Substance and Sexual Activity    Alcohol use: Never    Drug use: Never         ROS  Review of Systems   Respiratory:  Negative for shortness of breath.    Cardiovascular:  Negative for chest pain.   Genitourinary:  Positive for dysuria, frequency and hematuria.   Psychiatric/Behavioral:  The patient is nervous/anxious.    All other systems reviewed and are negative.     All other pertinent organ systems are reviewed and are negative.      Physical Exam     ED Triage Vitals [03/09/25 2011]   /62   Pulse 77   Resp 16   Temp 97.6 °F (36.4 °C)   Temp src Oral   SpO2 99 %   O2 Device None (Room air)     Vital signs reviewed.      Physical Exam  Vitals and nursing note reviewed.   Cardiovascular:      Pulses: Normal pulses.   Pulmonary:      Effort: No respiratory distress.   Abdominal:      General: There is no distension.      Tenderness: There is no abdominal tenderness.   Neurological:      Mental Status: He is alert.         ED Course       Labs:     Labs Reviewed   CBC WITH  DIFFERENTIAL WITH PLATELET - Abnormal; Notable for the following components:       Result Value    RBC 3.28 (*)     HGB 9.4 (*)     HCT 29.9 (*)     RDW-SD 46.7 (*)     All other components within normal limits   BASIC METABOLIC PANEL (8) - Abnormal; Notable for the following components:    Glucose 103 (*)     Sodium 131 (*)     Potassium 5.4 (*)     Chloride 97 (*)     BUN 34 (*)     BUN/CREA Ratio 32.7 (*)     All other components within normal limits   URINALYSIS WITH CULTURE REFLEX - Abnormal; Notable for the following components:    Urine Color Red (*)     Clarity Urine Turbid (*)     WBC Urine >50 (*)     RBC Urine >10 (*)     All other components within normal limits   UA MICROSCOPIC ONLY, URINE - Abnormal; Notable for the following components:    WBC Urine >50 (*)     RBC Urine >10 (*)     All other components within normal limits   ICTOTEST - Normal   URINE CULTURE, ROUTINE         My EKG Interpretation:   As reviewed and Interpreted by me      Imaging Results Available and Reviewed while in ED:   No results found.      Decision rules/scores evaluated: none      Diagnostic labs/tests considered but not ordered: CT abd/pelvis    ED Medications Administered:   Medications   sodium chloride 0.9 % irrigation 3,000 mL (3,000 mL Irrigation New Bag 3/9/25 2215)   lidocaine (Urojet) 2 % urethral jelly 10 mL (10 mL Urethral Given 3/9/25 2122)   sodium chloride 0.9 % IV bolus 1,000 mL (1,000 mL Intravenous New Bag 3/9/25 2216)            - cbi with clearing of urine - now koolaid colored - no clots, flowing well    MDM       Medical Decision Making      Differential Diagnosis: After obtaining the patient's history, performing the physical exam and reviewing the diagnostics, multiple initial diagnoses were considered based on the presenting problem including gastroenteritis, viral syndrome, UTI, pyelonephritis, bladder cancer    External document review: I personally reviewed available external medical records for any  recent pertinent discharge summaries, testing, and procedures - the findings are as follows: 10/11/24 admission for pneumonia    Complicating Factors: The patient already  has a past medical history of Asthma (HCC), Cancer (HCC), and Essential hypertension. to contribute to the complexity of this ED evaluation.    Procedures performed: none    Discussed management with physician/appropriate source: none    Considered admission/deescalation of care for: none    Social determinants of health affecting patient care: none    Prescription medications considered: discussed continuing current medication regimen    The patient requires continuous monitoring for: hematuria    Shared decision making: discussed possible admission      Disposition and Plan     Clinical Impression:  1. Urinary retention    2. Uremia    3. Hyperkalemia        Disposition:  Discharge    Follow-up:  Ayla Swann MD  [address] [id number] [phone]    Follow up in 2 day(s)  recheck potassium in 2 days    Aria Fields MD  [address]  [facility] [address]  [phone]    Follow up        Medications Prescribed:  Current Discharge Medication List                         [1] (Not in a hospital admission)